# Patient Record
Sex: FEMALE | Race: BLACK OR AFRICAN AMERICAN
[De-identification: names, ages, dates, MRNs, and addresses within clinical notes are randomized per-mention and may not be internally consistent; named-entity substitution may affect disease eponyms.]

---

## 2018-04-13 NOTE — HP
CIWA Score





- CIWA Score


Nausea/Vomitin-No Nausea/No Vomiting


Muscle Tremors: 2


Anxiety: 2


Agitation: 2


Paroxysmal Sweats: 2


Orientation: 0-Oriented


Tacttile Disturbances: 2-Mild Itch/Numbness/Burn (b/t feet, hands and knees)


Auditory Disturbances: 0-None


Visual Disturbances: 1-Very Mild Sensitivity


Headache: 1-Very Mild


CIWA-Ar Total Score: 12





[facility]





- HPI


Chief Complaint: 





"I am feeling sick"


Allergies/Adverse Reactions: 


 Allergies











Allergy/AdvReac Type Severity Reaction Status Date / Time


 


aspirin Allergy Intermediate  Verified 18 19:58


 


Lactose-Intolerance AdvReac Severe  Uncoded 18 19:58











History of Present Illness: 





52 yo female with hx of alcohol, nicotine, marijuana and cocaine dependence is 

here seeking detox. PMHX: HTN , hyperlipidemia, GERD, OA multiple location, 

depression, PTSD, anxiety , bipolar. Denies suicidal / homicidal ideation or 

suicide attempts. Last detox 2 years ago at [facility]. Longest period of 

sobriety 5.5 years.  


Exam Limitations: No Limitations





- Ebola screening


Have you traveled outside of the country in the last 21 days: No (N)


Have you had contact with anyone from an Ebola affected area: No


Have you been sick,other than usual withdrawal symptoms: No


Do you have a fever: No





- Review of Systems


Constitutional: Chills, Changes in sleep, Unintentional Wgt. Loss (5 lbs a 4 

day period)


EENT: reports: Blurred Vision (wears glasses), Dental Problems (dental pain, 

bottom tooth chipped)


Respiratory: reports: No Symptoms reported


Cardiac: reports: No Symptoms Reported


GI: reports: Poor Fluid Intake


: reports: No Symptoms Reported


Musculoskeletal: reports: Back Pain, Joint Pain


Integumentary: reports: No Symptoms Reported


Neuro: reports: Numbness, Tingling, Dizziness


Endocrine: reports: Increased Thirst


Hematology: reports: No Symptoms Reported


Psychiatric: reports: Orientated x3, Anxious





Patient History





- Patient Medical History


Hx Anemia: No


Hx Asthma: No


Hx Chronic Obstructive Pulmonary Disease (COPD): No


Hx Cancer: No


Hx Cardiac Disorders: No


Hx Congestive Heart Failure: No (hjeart murmur )


Hx Hypertension: Yes (on medication)


Hx Hypercholesterolemia: Yes (on med)


Hx Pacemaker: No


HX Cerebrovascular Accident: No


Hx Seizures: No


Hx Dementia: No


Hx Diabetes: No


Hx Gastrointestinal Disorders: No


Hx Liver Disease: No


Hx Genitourinary Disorders: Yes (HPV in 3/2015)


Hx Sexually Transmitted Disorders: Yes (HVP)


Hx Renal Disease (ESRD): No


Hx Thyroid Disease: No


Hx Human Immunodeficiency Virus (HIV): No (last 05/15 )


Hx Hepatitis C: No


Hx Depression: Yes


Hx Suicide Attempt: No


Hx Bipolar Disorder: Yes (no med)


Hx Schizophrenia: No





- Patient Surgical History


Past Surgical History: No


Hx Neurologic Surgery: No


Hx Cataract Extraction: No


Hx Cardiac Surgery: No


Hx Lung Surgery: No


Hx Breast Surgery: No


Hx Breast Biopsy: No


Hx Abdominal Surgery: No


Hx Appendectomy: No


Hx Cholecystectomy: No


Hx Genitourinary Surgery: No


Hx  Section: No


Hx Orthopedic Surgery: No


Anesthesia Reaction: No





- PPD History


Previous Implant?: Yes


Documented Results: Positive w/o proof


PPD to be Administered?: No





- Reproductive History


Last Menstrual Period: 03/23/10


Patient Pregnant: No





- Smoking Cessation


Smoking history: Current every day smoker


Have you smoked in the past 12 months: Yes


Aproximately how many cigarettes per day: 20


Hx Chewing Tobacco Use: No


Initiated information on smoking cessation: Yes


'Breaking Loose' booklet given: 18





- Substance & Tx. History


Hx Alcohol Use: Yes


Hx Substance Use: Yes


Substance Use Type: Alcohol, Cocaine, Marijuana


Hx Substance Use Treatment: Yes (Last detox 2 years ago at [facility])





- Substances Abused


  ** Alcohol


Route: Oral


Frequency: Daily


Amount used: liquor- 1 pint


Age of first use: 13


Date of Last Use: 18





  ** Crack


Route: Smoking


Frequency: Daily


Amount used: 10 bags


Age of first use: 19


Date of Last Use: 18





Family Disease History





- Family Disease History


Family Disease History: Other: Father (alcohol,), Mother ()





Admission Physical Exam [facility]





- Vital Signs


Vital Signs: 


 Vital Signs - 24 hr











  18





  16:01


 


Pulse Rate 74


 


Respiratory 20





Rate 


 


Blood Pressure 140/89














- Physical


General Appearance: Yes: Mild Distress, Sweating, Anxious


HEENTM: Yes: EOMI, Hearing grossly Normal, Normal ENT Inspection, Normocephalic

, Pharynx Normal, Tm's normal, Other (poor dentation)


Respiratory: Yes: Chest Non-Tender, Lungs Clear, Normal Breath Sounds, No 

Respiratory Distress, No Accessory Muscle Use


Neck: Yes: No masses,lesions,Nodules, Trachea in good position


Breast: Yes: Breast Exam Deferred


Cardiology: Yes: Regular Rhythm, Regular Rate, Murmur


Abdominal: Yes: Normal Bowel Sounds, Non Tender, Flat, Soft


Genitourinary: Yes: Within Normal Limits


Back: Yes: Normal Inspection


Musculoskeletal: Yes: full range of Motion, Gait Steady


Extremities: Yes: Normal Capillary Refill, Normal Inspection, Normal Range of 

Motion


Neurological: Yes: CNs II-XII NML intact, Fully Oriented, Alert, Motor Strength 

5/5


Integumentary: Yes: Normal Color, Dry, Warm


Lymphatic: Yes: Within Normal Limits





- Diagnostic


(1) Alcohol dependence with withdrawal


Current Visit: Yes   Status: Acute   


Qualifiers: 


   Complication of substance-induced condition: uncomplicated   Qualified Code(s

): F10.230 - Alcohol dependence with withdrawal, uncomplicated   





(2) Dehydration


Current Visit: Yes   Status: Acute   





(3) Essential hypertension


Current Visit: No   Status: Acute   Comment: .   





(4) Hypercholesterolemia


Current Visit: Yes   Status: Chronic   Comment: .   





(5) Nicotine dependence


Current Visit: Yes   Status: Acute   


Qualifiers: 


   Nicotine product type: cigarettes 


Comment: .   





(6) Weight loss


Current Visit: Yes   Status: Acute   





(7) Cannabis dependence


Current Visit: Yes   Status: Chronic   Comment: .   





(8) Cocaine dependence


Current Visit: Yes   Status: Chronic   


Qualifiers: 


   Substance use status: uncomplicated   Qualified Code(s): F14.20 - Cocaine 

dependence, uncomplicated   


Comment: .   





(9) Eczema


Current Visit: Yes   Status: Chronic   


Qualifiers: 


   Eczema type: unspecified   Qualified Code(s): L30.9 - Dermatitis, 

unspecified   


Comment: .   





Cleared for Admission [facility]





- Detox or Rehab


[facility] Level of Care: Medically Managed


Detox Regimen/Protocol: Librium





[facility] Breath Alcohol Content


Breath Alcohol Content: 0





Urine Pregancy Test





- Result


Urine Pregnancy Test Results: Negative- NO Line Present





Urine Drug Screen





- Results


Drug Screen Negative: No


Urine Drug Screen Results: THC-Marijuana, ALCIRA-Cocaine
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
## 2018-04-14 NOTE — PN
S CIWA





- CIWA Score


Nausea/Vomiting: 3


Muscle Tremors: 3


Anxiety: 3


Agitation: 3


Paroxysmal Sweats: 1-Minimal Palms Moist


Orientation: 0-Oriented


Tacttile Disturbances: 1-Very Mild Itch/Numbness


Auditory Disturbances: 1-Very Mild


Visual Disturbances: 0-None


Headache: 2-Mild


CIWA-Ar Total Score: 17





[facility] Progress Note (SOAP)


Subjective: 





ALERT,IRRITABLE,ANXIOUS,INTERRUPTED SLEEP,TREMOR


Objective: 





04/14/18 13:30


 Vital Signs











Temperature  98.2 F   04/14/18 10:43


 


Pulse Rate  77   04/14/18 10:43


 


Respiratory Rate  18   04/14/18 10:43


 


Blood Pressure  144/77   04/14/18 10:43


 


O2 Sat by Pulse Oximetry (%)      








EKG NSR,NORMAL ECG


 Laboratory Last Values











WBC  5.4 K/mm3 (4.0-10.0)   04/14/18  07:50    


 


RBC  4.13 M/mm3 (3.60-5.2)   04/14/18  07:50    


 


Hgb  12.7 GM/dL (10.7-15.3)   04/14/18  07:50    


 


Hct  38.0 % (32.4-45.2)   04/14/18  07:50    


 


MCV  92.0 fl (80-96)   04/14/18  07:50    


 


MCH  30.8 pg (25.7-33.7)   04/14/18  07:50    


 


MCHC  33.4 g/dl (32.0-36.0)   04/14/18  07:50    


 


RDW  13.7 % (11.6-15.6)   04/14/18  07:50    


 


Plt Count  245 K/MM3 (134-434)   04/14/18  07:50    


 


MPV  9.9 fl (7.5-11.1)   04/14/18  07:50    


 


Sodium  144 mmol/L (136-145)   04/14/18  07:50    


 


Potassium  3.7 mmol/L (3.5-5.1)   04/14/18  07:50    


 


Chloride  107 mmol/L ()   04/14/18  07:50    


 


Carbon Dioxide  31 mmol/L (21-32)   04/14/18  07:50    


 


Anion Gap  6  (8-16)  L  04/14/18  07:50    


 


BUN  9 mg/dL (7-18)   04/14/18  07:50    


 


Creatinine  0.8 mg/dL (0.55-1.02)   04/14/18  07:50    


 


Creat Clearance w eGFR  > 60  (>60)   04/14/18  07:50    


 


Random Glucose  94 mg/dL ()   04/14/18  07:50    


 


Calcium  9.0 mg/dL (8.5-10.1)   04/14/18  07:50    


 


Total Bilirubin  0.4 mg/dL (0.2-1.0)  D 04/14/18  07:50    


 


AST  15 U/L (15-37)   04/14/18  07:50    


 


ALT  17 U/L (12-78)   04/14/18  07:50    


 


Alkaline Phosphatase  48 U/L ()   04/14/18  07:50    


 


Total Protein  6.2 g/dl (6.4-8.2)  L  04/14/18  07:50    


 


Albumin  3.3 g/dl (3.4-5.0)  L  04/14/18  07:50    


 


Urine Color  Dkyellow   04/13/18  21:47    


 


Urine Appearance  Slcloudy   04/13/18  21:47    


 


Urine pH  6.0  (5.0-8.0)   04/13/18  21:47    


 


Ur Specific Gravity  1.027  (1.001-1.035)   04/13/18  21:47    


 


Urine Protein  1+  (NEGATIVE)  H  04/13/18  21:47    


 


Urine Glucose (UA)  1+  (NEGATIVE)  H  04/13/18  21:47    


 


Urine Ketones  Negative  (NEGATIVE)   04/13/18  21:47    


 


Urine Blood  Negative  (NEGATIVE)   04/13/18  21:47    


 


Urine Nitrite  Negative  (NEGATIVE)   04/13/18  21:47    


 


Urine Bilirubin  Negative  (<2.0 mg/dL)   04/13/18  21:47    


 


Urine Urobilinogen  4.0 e.u/dl mg/dL (0.2-1.0)  H  04/13/18  21:47    


 


Ur Leukocyte Esterase  Trace  (NEGATIVE)   04/13/18  21:47    


 


Urine WBC (Auto)  9 /hpf (3-5)   04/13/18  21:47    


 


Urine RBC (Auto)  None /hpf (0-3)   04/13/18  21:47    


 


Ur Epithelial Cells  Rare /HPF (FEW)   04/13/18  21:47    


 


Calcium Oxalate Crystal  Many /hpf (NONE SEEN)   04/13/18  21:47    


 


Urine Mucus  Few   04/13/18  21:47    


 


RPR Titer  Nonreactive  (NONREACTIVE)   04/14/18  07:50    











Assessment: 





04/14/18 13:32


WITHDRAWAL SYMPTOM


Plan: 





CONTINUE DETOX

## 2018-04-15 NOTE — PN
S CIWA





- CIWA Score


Nausea/Vomitin-Mild Nausea/No Vomiting


Muscle Tremors: 4-Moderate,w/Arms Extend


Anxiety: 3


Agitation: 3


Paroxysmal Sweats: 1-Minimal Palms Moist


Orientation: 0-Oriented


Tacttile Disturbances: 1-Very Mild Itch/Numbness


Auditory Disturbances: 0-None


Visual Disturbances: 0-None


Headache: 1-Very Mild


CIWA-Ar Total Score: 14





[facility] Progress Note (SOAP)


Subjective: 





sweat tremor anxiety restlessness mild gi distress


Objective: 





04/15/18 13:16


 Vital Signs











Temperature  97.9 F   04/15/18 12:03


 


Pulse Rate  71   04/15/18 12:03


 


Respiratory Rate  18   04/15/18 12:03


 


Blood Pressure  133/76   04/15/18 12:03


 


O2 Sat by Pulse Oximetry (%)      








 Laboratory Last Values











WBC  5.4 K/mm3 (4.0-10.0)   18  07:50    


 


RBC  4.13 M/mm3 (3.60-5.2)   18  07:50    


 


Hgb  12.7 GM/dL (10.7-15.3)   18  07:50    


 


Hct  38.0 % (32.4-45.2)   18  07:50    


 


MCV  92.0 fl (80-96)   18  07:50    


 


MCH  30.8 pg (25.7-33.7)   18  07:50    


 


MCHC  33.4 g/dl (32.0-36.0)   18  07:50    


 


RDW  13.7 % (11.6-15.6)   18  07:50    


 


Plt Count  245 K/MM3 (134-434)   18  07:50    


 


MPV  9.9 fl (7.5-11.1)   18  07:50    


 


Sodium  144 mmol/L (136-145)   18  07:50    


 


Potassium  3.7 mmol/L (3.5-5.1)   18  07:50    


 


Chloride  107 mmol/L ()   18  07:50    


 


Carbon Dioxide  31 mmol/L (21-32)   18  07:50    


 


Anion Gap  6  (8-16)  L  18  07:50    


 


BUN  9 mg/dL (7-18)   18  07:50    


 


Creatinine  0.8 mg/dL (0.55-1.02)   18  07:50    


 


Creat Clearance w eGFR  > 60  (>60)   18  07:50    


 


Random Glucose  94 mg/dL ()   18  07:50    


 


Calcium  9.0 mg/dL (8.5-10.1)   18  07:50    


 


Total Bilirubin  0.4 mg/dL (0.2-1.0)  D 18  07:50    


 


AST  15 U/L (15-37)   18  07:50    


 


ALT  17 U/L (12-78)   18  07:50    


 


Alkaline Phosphatase  48 U/L ()   18  07:50    


 


Total Protein  6.2 g/dl (6.4-8.2)  L  18  07:50    


 


Albumin  3.3 g/dl (3.4-5.0)  L  18  07:50    


 


Urine Color  Dkyellow   18  21:47    


 


Urine Appearance  Slcloudy   18  21:47    


 


Urine pH  6.0  (5.0-8.0)   18  21:47    


 


Ur Specific Gravity  1.027  (1.001-1.035)   18  21:47    


 


Urine Protein  1+  (NEGATIVE)  H  18  21:47    


 


Urine Glucose (UA)  1+  (NEGATIVE)  H  18  21:47    


 


Urine Ketones  Negative  (NEGATIVE)   18  21:47    


 


Urine Blood  Negative  (NEGATIVE)   18  21:47    


 


Urine Nitrite  Negative  (NEGATIVE)   18  21:47    


 


Urine Bilirubin  Negative  (<2.0 mg/dL)   18  21:47    


 


Urine Urobilinogen  4.0 e.u/dl mg/dL (0.2-1.0)  H  18  21:47    


 


Ur Leukocyte Esterase  Trace  (NEGATIVE)   18  21:47    


 


Urine WBC (Auto)  9 /hpf (3-5)   18  21:47    


 


Urine RBC (Auto)  None /hpf (0-3)   18  21:47    


 


Ur Epithelial Cells  Rare /HPF (FEW)   18  21:47    


 


Calcium Oxalate Crystal  Many /hpf (NONE SEEN)   18  21:47    


 


Urine Mucus  Few   18  21:47    


 


RPR Titer  Nonreactive  (NONREACTIVE)   18  07:50    








lab noted


increase oral fluid


Assessment: 





04/15/18 13:17


withdrawal sx


Plan: 





continue detox

## 2018-04-16 NOTE — PN
[facility] Progress Note (SOAP)


Subjective: 





ALERT,IRRITABLE,ANXIOUS,INTERRUPTED SLEEP


Objective: 





04/16/18 10:03


ALERT,IRRITABLE,ANXIOUS,INTERRUPTED SLEEP


04/16/18 10:05


 Vital Signs











Temperature  96.0 F L  04/16/18 06:22


 


Pulse Rate  64   04/16/18 06:22


 


Respiratory Rate  18   04/16/18 06:22


 


Blood Pressure  157/92   04/16/18 06:22


 


O2 Sat by Pulse Oximetry (%)      











Assessment: 





04/16/18 10:06


WITHDRAWAL SYMPTOM


Plan: 





CONTINUE DETOX,DISCHARGE IN AM

## 2018-04-16 NOTE — EKG
Test Reason : 

Blood Pressure : ***/*** mmHG

Vent. Rate : 074 BPM     Atrial Rate : 074 BPM

   P-R Int : 146 ms          QRS Dur : 084 ms

    QT Int : 384 ms       P-R-T Axes : 070 054 036 degrees

   QTc Int : 426 ms

 

NORMAL SINUS RHYTHM

NORMAL ECG

NO PREVIOUS ECGS AVAILABLE

Confirmed by CHERRI AGUILAR MD ([id number]) on 4/16/2018 12:42:33 PM

 

Referred By:             Confirmed By:CHERRI AGUILAR MD

## 2018-04-17 NOTE — DS
[facility] Detox Discharge Summary


Admission Date: 


04/13/18





Discharge Date: 04/17/18





- History


Present History: Alcohol Dependence, Cannabis Dependence, Cocaine Dependence


Additional Comments: 





FOLLOW UP WITH [facility] AS ARRANGEMENT


Pertinent Past History: 





ESSENTIAL HYPERTENSION


HYPERCHOLESTEROLEMIA


NICOTINE DEPENDENCE


WEIGHT LOSS


ECZEMA





- Physical Exam Results


Vital Signs: 


 Vital Signs











Temperature  97.3 F L  04/17/18 06:22


 


Pulse Rate  64   04/17/18 06:22


 


Respiratory Rate  18   04/17/18 06:22


 


Blood Pressure  139/75   04/17/18 06:22


 


O2 Sat by Pulse Oximetry (%)      











Pertinent Admission Physical Exam Findings: 





WITHDRAWAL SIGNS AND SYMPTOM


 Vital Signs











Temperature  97.3 F L  04/17/18 06:22


 


Pulse Rate  64   04/17/18 06:22


 


Respiratory Rate  18   04/17/18 06:22


 


Blood Pressure  139/75   04/17/18 06:22


 


O2 Sat by Pulse Oximetry (%)      








 Laboratory Last Values











WBC  5.4 K/mm3 (4.0-10.0)   04/14/18  07:50    


 


RBC  4.13 M/mm3 (3.60-5.2)   04/14/18  07:50    


 


Hgb  12.7 GM/dL (10.7-15.3)   04/14/18  07:50    


 


Hct  38.0 % (32.4-45.2)   04/14/18  07:50    


 


MCV  92.0 fl (80-96)   04/14/18  07:50    


 


MCH  30.8 pg (25.7-33.7)   04/14/18  07:50    


 


MCHC  33.4 g/dl (32.0-36.0)   04/14/18  07:50    


 


RDW  13.7 % (11.6-15.6)   04/14/18  07:50    


 


Plt Count  245 K/MM3 (134-434)   04/14/18  07:50    


 


MPV  9.9 fl (7.5-11.1)   04/14/18  07:50    


 


Sodium  144 mmol/L (136-145)   04/14/18  07:50    


 


Potassium  3.7 mmol/L (3.5-5.1)   04/14/18  07:50    


 


Chloride  107 mmol/L ()   04/14/18  07:50    


 


Carbon Dioxide  31 mmol/L (21-32)   04/14/18  07:50    


 


Anion Gap  6  (8-16)  L  04/14/18  07:50    


 


BUN  9 mg/dL (7-18)   04/14/18  07:50    


 


Creatinine  0.8 mg/dL (0.55-1.02)   04/14/18  07:50    


 


Creat Clearance w eGFR  > 60  (>60)   04/14/18  07:50    


 


Random Glucose  94 mg/dL ()   04/14/18  07:50    


 


Calcium  9.0 mg/dL (8.5-10.1)   04/14/18  07:50    


 


Total Bilirubin  0.4 mg/dL (0.2-1.0)  D 04/14/18  07:50    


 


AST  15 U/L (15-37)   04/14/18  07:50    


 


ALT  17 U/L (12-78)   04/14/18  07:50    


 


Alkaline Phosphatase  48 U/L ()   04/14/18  07:50    


 


Total Protein  6.2 g/dl (6.4-8.2)  L  04/14/18  07:50    


 


Albumin  3.3 g/dl (3.4-5.0)  L  04/14/18  07:50    


 


Urine Color  Dkyellow   04/13/18  21:47    


 


Urine Appearance  Slcloudy   04/13/18  21:47    


 


Urine pH  6.0  (5.0-8.0)   04/13/18  21:47    


 


Ur Specific Gravity  1.027  (1.001-1.035)   04/13/18  21:47    


 


Urine Protein  1+  (NEGATIVE)  H  04/13/18  21:47    


 


Urine Glucose (UA)  1+  (NEGATIVE)  H  04/13/18  21:47    


 


Urine Ketones  Negative  (NEGATIVE)   04/13/18  21:47    


 


Urine Blood  Negative  (NEGATIVE)   04/13/18  21:47    


 


Urine Nitrite  Negative  (NEGATIVE)   04/13/18  21:47    


 


Urine Bilirubin  Negative  (<2.0 mg/dL)   04/13/18  21:47    


 


Urine Urobilinogen  4.0 e.u/dl mg/dL (0.2-1.0)  H  04/13/18  21:47    


 


Ur Leukocyte Esterase  Trace  (NEGATIVE)   04/13/18  21:47    


 


Urine WBC (Auto)  9 /hpf (3-5)   04/13/18  21:47    


 


Urine RBC (Auto)  None /hpf (0-3)   04/13/18  21:47    


 


Ur Epithelial Cells  Rare /HPF (FEW)   04/13/18  21:47    


 


Calcium Oxalate Crystal  Many /hpf (NONE SEEN)   04/13/18  21:47    


 


Urine Mucus  Few   04/13/18  21:47    


 


RPR Titer  Nonreactive  (NONREACTIVE)   04/14/18  07:50    














- Treatment


Hospital Course: Detox Protocol Followed, Detoxed Safely, Responded well, 

Discharged Condition Good, Rehab Referral Accepted


Patient has Accepted a Rehab Referral to: [facility]





- Medication


Discharge Medications: 


Ambulatory Orders





Calcium + Vitamin D Tablet 1 PO BID 09/12/15 


Cyproheptadine HCl 1 mg PO TID 09/12/15 


Pravastatin Sodium [Pravachol -] 10 mg PO HS 09/12/15 


Naproxen [Naprosyn -] 500 mg PO BID #20 tablet 10/02/15 


Thiamine HCl [Vitamin B1 -] 100 mg PO HS #30 tablet 10/02/15 


Risperidone [Risperdal] 1 mg PO DAILY 04/13/18 


Atorvastatin Ca [Lipitor] 10 mg PO HS #30 tablet 04/16/18 


Hydrochlorothiazide [Hctz -] 25 mg PO DAILY@0600 #30 tablet 04/16/18 


Metoprolol Tartrate [Lopressor -] 50 mg PO BID@0600,1800 #60 tablet 04/16/18 


Pantoprazole Sodium [Protonix] 20 mg PO DAILY #30 tablet. 04/16/18 











- Diagnosis


(1) Alcohol dependence with withdrawal


Current Visit: Yes   Status: Acute   


Qualifiers: 


   Complication of substance-induced condition: uncomplicated   Qualified Code(s

): F10.230 - Alcohol dependence with withdrawal, uncomplicated   





(2) Dehydration


Current Visit: Yes   Status: Acute   





(3) Nicotine dependence


Current Visit: Yes   Status: Acute   


Qualifiers: 


   Nicotine product type: cigarettes 





(4) Weight loss


Current Visit: Yes   Status: Acute   





(5) Cannabis dependence


Current Visit: Yes   Status: Chronic   





(6) Cocaine dependence


Current Visit: Yes   Status: Chronic   


Qualifiers: 


   Substance use status: uncomplicated   Qualified Code(s): F14.20 - Cocaine 

dependence, uncomplicated   





(7) Eczema


Current Visit: Yes   Status: Chronic   


Qualifiers: 


   Eczema type: unspecified   Qualified Code(s): L30.9 - Dermatitis, 

unspecified   





(8) Hypercholesterolemia


Current Visit: Yes   Status: Chronic   





(9) Essential hypertension


Current Visit: No   Status: Acute   





(10) Syncope


Current Visit: No   Status: Acute   





(11) G6PD deficiency


Current Visit: No   Status: Chronic   





(12) Substance induced mood disorder


Current Visit: No   Status: Chronic   





(13) Bipolar disorder


Current Visit: Yes   Status: Chronic   





- AMA


Did Patient Leave Against Medical Advice: No

## 2018-04-17 NOTE — PN
[facility] Progress Note (SOAP)


Subjective: 





ALERT,NO COMPLAINT


Objective: 





04/17/18 09:01


 Vital Signs











Temperature  97.3 F L  04/17/18 06:22


 


Pulse Rate  64   04/17/18 06:22


 


Respiratory Rate  18   04/17/18 06:22


 


Blood Pressure  139/75   04/17/18 06:22


 


O2 Sat by Pulse Oximetry (%)      








DETOX COMPLETED,NO WITHDRAWAL SYMPTOM


Assessment: 





04/17/18 09:02


DETOX COMPLETED,NO WITHDRAWAL SYMPTOM


Plan: 





DISCHARGE TODAY,FOLLOW UP WITH AFTER CARE PROGRAM AS ARRANGEMENT

## 2018-07-18 NOTE — HP
CIWA Score





- CIWA Score


Nausea/Vomiting: 3


Muscle Tremors: 3


Anxiety: 3


Agitation: 2


Paroxysmal Sweats: 1-Minimal Palms Moist


Orientation: 0-Oriented


Tacttile Disturbances: 1-Very Mild Itch/Numbness


Auditory Disturbances: 1-Very Mild


Visual Disturbances: 0-None


Headache: 2-Mild


CIWA-Ar Total Score: 16





Admission ROS [facility]





- HPI


Chief Complaint: 





i need help to stop drinking alcohol,crack and marijuana


Allergies/Adverse Reactions: 


 Allergies











Allergy/AdvReac Type Severity Reaction Status Date / Time


 


aspirin Allergy Intermediate  Verified 18 14:40


 


lactose AdvReac Severe LACTOSE Verified 18 16:06





   INTOLERANCE  


 


Lactose-Intolerance AdvReac Severe  Uncoded 18 14:40











History of Present Illness: 





this 53 years old female with alcohol,cocaine and marijuana dependence,seeking 

detox,withdrawal symptom,last detox 18 to 18


syncope alcohol related


htn


nicotine dependence


bipolar disorder


longest period of sobriety 6 years


Exam Limitations: No Limitations





- Ebola screening


Have you traveled outside of the country in the last 21 days: No (N)


Have you had contact with anyone from an Ebola affected area: No


Have you been sick,other than usual withdrawal symptoms: No


Do you have a fever: No





- Review of Systems


Constitutional: Loss of Appetite, Night Sweats, Changes in sleep, Weakness, 

Unintentional Wgt. Loss


EENT: reports: Tearing, Nose Congestion


Respiratory: reports: No Symptoms reported


Cardiac: reports: No Symptoms Reported


GI: reports: Diarrhea, Nausea, Vomiting, Abdominal cramping


: reports: No Symptoms Reported


Musculoskeletal: reports: Back Pain, Muscle Pain


Integumentary: reports: Dryness


Neuro: reports: Headache, Tremors


Endocrine: reports: No Symptoms Reported


Hematology: reports: No Symptoms Reported


Psychiatric: reports: No Sypmtoms Reported, Judgement Intact, Mood/Affect 

Appropiate, other (bipolar disorder)





Patient History





- Patient Medical History


Hx Anemia: No


Hx Asthma: No


Hx Chronic Obstructive Pulmonary Disease (COPD): No


Hx Cancer: No


Hx Cardiac Disorders: No


Hx Congestive Heart Failure: No (hjeart murmur )


Hx Hypertension: Yes (on med)


Hx Hypercholesterolemia: Yes (on med)


Hx Pacemaker: No


HX Cerebrovascular Accident: No


Hx Seizures: No


Hx Dementia: No


Hx Diabetes: No


Hx Gastrointestinal Disorders: Yes (acid reflux)


Hx Liver Disease: No


Hx Genitourinary Disorders: No


Hx Sexually Transmitted Disorders: No


Hx Renal Disease (ESRD): No


Hx Thyroid Disease: No


Hx Human Immunodeficiency Virus (HIV): No (last  negative)


Hx Hepatitis C: No


Hx Depression: Yes


Hx Suicide Attempt: No


Hx Bipolar Disorder: Yes (non compliance)


Hx Schizophrenia: No


Other Medical History: no suicidal,no homicidal





- Patient Surgical History


Past Surgical History: No


Hx Neurologic Surgery: No


Hx Cataract Extraction: No


Hx Cardiac Surgery: No


Hx Lung Surgery: No


Hx Breast Surgery: No


Hx Breast Biopsy: No


Hx Abdominal Surgery: No


Hx Appendectomy: No


Hx Cholecystectomy: No


Hx Genitourinary Surgery: No


Hx  Section: No


Hx Orthopedic Surgery: No


Anesthesia Reaction: No





- PPD History


Previous Implant?: Yes


Documented Results: Positive w/o proof


PPD to be Administered?: No





- Reproductive History


Patient is a Female of Child Bearing Age (11 -55 yrs old): Yes


Last Menstrual Period: 03/23/10


Patient Pregnant: No





- Smoking Cessation


Smoking history: Current every day smoker


Have you smoked in the past 12 months: Yes


Aproximately how many cigarettes per day: 30


Hx Chewing Tobacco Use: No


Initiated information on smoking cessation: Yes


'Breaking Loose' booklet given: 18





- Substance & Tx. History


Hx Alcohol Use: Yes


Hx Substance Use: Yes


Substance Use Type: Alcohol, Cocaine, Marijuana





- Substances Abused


  ** Crack


Route: Smoking


Frequency: Daily


Amount used: $200


Age of first use: 19


Date of Last Use: 18





  ** Alcohol-vodka


Route: Oral


Frequency: Daily


Amount used: 2 pts.


Age of first use: 16


Date of Last Use: 18





  ** Marijuana


Route: Smoking


Frequency: 3-6 times per week


Amount used: $20


Age of first use: 13


Date of Last Use: 18





Family Disease History





- Family Disease History


Family Disease History: Other: Father (alcohol,), Mother ()





Admission Physical Exam [facility]





- Vital Signs


Vital Signs: 


 Vital Signs - 24 hr











  18





  15:16


 


Temperature 97.6 F


 


Pulse Rate 77


 


Respiratory 16





Rate 


 


Blood Pressure 146/90














- Physical


General Appearance: Yes: Moderate Distress, Tremorous, Irritable, Sweating, 

Anxious


HEENTM: Yes: BROOK, Pharynx Normal


Respiratory: Yes: Lungs Clear, Normal Breath Sounds, No Respiratory Distress


Neck: Yes: Within Normal Limits, Supple, Trachea in good position


Breast: Yes: Breast Exam Deferred


Cardiology: Yes: Within Normal Limits, Regular Rhythm, Regular Rate, S1, S2


Abdominal: Yes: Within Normal Limits, Normal Bowel Sounds, Non Tender, Soft


Genitourinary: Yes: Within Normal Limits


Back: Yes: Muscle Spasm


Musculoskeletal: Yes: Back pain, Muscle Pain


Extremities: Yes: Tremors


Neurological: Yes: CNs II-XII NML intact, Fully Oriented, Alert, Motor Strength 

5/5


Integumentary: Yes: Dry


Lymphatic: Yes: Within Normal Limits





- Diagnostic


(1) Alcohol dependence with withdrawal


Current Visit: No   Status: Acute   


Qualifiers: 


   Complication of substance-induced condition: uncomplicated   Qualified Code(s

): F10.230 - Alcohol dependence with withdrawal, uncomplicated   





(2) Dehydration


Current Visit: No   Status: Acute   





(3) Essential hypertension


Current Visit: No   Status: Acute   Comment: .   





(4) Nicotine dependence


Current Visit: No   Status: Acute   


Qualifiers: 


   Nicotine product type: cigarettes 


Comment: .   





(5) Syncope


Current Visit: No   Status: Acute   





(6) Weight loss


Current Visit: No   Status: Acute   





(7) Cannabis dependence


Current Visit: No   Status: Chronic   Comment: .   





(8) Cocaine dependence


Current Visit: No   Status: Chronic   


Qualifiers: 


   Substance use status: uncomplicated   Qualified Code(s): F14.20 - Cocaine 

dependence, uncomplicated   


Comment: .   





(9) G6PD deficiency


Current Visit: No   Status: Chronic   Comment: .   





(10) Hypercholesterolemia


Current Visit: No   Status: Chronic   Comment: .   





Cleared for Admission [facility]





- Detox or Rehab


[facility] Level of Care: Medically Managed


Detox Regimen/Protocol: Librium





[facility] Breath Alcohol Content


Breath Alcohol Content: 0





Urine Drug Screen





- Results


Drug Screen Negative: Yes


Urine Drug Screen Results: THC-Marijuana, ALCIRA-Cocaine

## 2018-07-19 NOTE — CONSULT
[facility] Psychiatric Consult





- Data


Date of interview: 07/19/18


Admission source: [facility]


Identifying data: This is 53 years old female, single, unemployed, living with 

family, with no income, history of Bipolar disorder,  with alcohol,cocaine and 

marijuana dependence,seeking detox reporting withdrawal symptoms,.  Patient 

denies psychiatric hospitalization history


Substance Abuse History: Smoking history: Current every day smoker.  Have you 

smoked in the past 12 months: Yes.  Aproximately how many cigarettes per day: 

30.  Hx Chewing Tobacco Use: No.  Initiated information on smoking cessation: 

Yes.  'Breaking Loose' booklet given: 07/18/18.  - Substance & Tx. History.  Hx 

Alcohol Use: Yes.  Hx Substance Use: Yes.  Substance Use Type: Alcohol, Cocaine

, Marijuana.  - Substances Abused.  ** Crack.  Route: Smoking.  Frequency: 

Daily.  Amount used: $200.  Age of first use: 19.  Date of Last Use: 07/17/18.  

** Alcohol-vodka.  Route: Oral.  Frequency: Daily.  Amount used: 2 pts.  Age of 

first use: 16.  Date of Last Use: 07/17/18.  ** Marijuana.  Route: Smoking.  

Frequency: 3-6 times per week.  Amount used: $20.  Age of first use: 13.  Date 

of Last Use: 07/17/18


Medical History: HTN, Weight loss, Hypercholesterolemia, Syncope history, NON 

Compliance with medications,


Psychiatric History: As per jaswinder bpatient suffers with Bipolar disorder, 

patient denies spsychiatric hospitalization history, reports history of 

depression and anxiety, reports taking Risperdal 1mg poqd prior to admission, 

denies suicidal and homicidal history.


Physical/Sexual Abuse/Trauma History: Denies


Additional Comment: Risperdal 1mg poqd





Mental Status Exam





- Mental Status Exam


Alert and Oriented to: Person


Cognitive Function: Fair


Patient Appearance: Unkempt


Mood: Sad


Affect: Flat


Patient Behavior: Sedated


Speech Pattern: Delayed


Voice Loudness: Mildly Soft/Quiet


Thought Process: Circumstantial


Thought Disorder: Being Controlled


Hallucinations: Denies


Suicidal Ideation: Denies


Homicidal Ideation: Denies


Insight/Judgement: Fair


Sleep: Difficulty falling asleep


Appetite: Weight loss


Muscle strength/Tone: Mild Hypotonicity


Gait/Station: Shuffling


Additional Comments: Risperdal 1mg poqd





Psychiatric Findings





- Problem List (Axis 1, 2,3)


(1) Alcohol dependence with withdrawal


Current Visit: No   Status: Acute   


Qualifiers: 


   Complication of substance-induced condition: uncomplicated   Qualified Code(s

): F10.230 - Alcohol dependence with withdrawal, uncomplicated   





(2) Mood disorder


Current Visit: No   Status: Acute   





(3) Syncope


Current Visit: No   Status: Acute   





(4) Weight loss


Current Visit: No   Status: Acute   





(5) Alcohol dependence


Current Visit: No   Status: Chronic   Comment: .   





(6) Bipolar disorder


Current Visit: No   Status: Chronic   Comment: History.    





(7) Cannabis dependence


Current Visit: No   Status: Chronic   Comment: .   





(8) Cocaine dependence


Current Visit: No   Status: Chronic   


Qualifiers: 


   Substance use status: uncomplicated   Qualified Code(s): F14.20 - Cocaine 

dependence, uncomplicated   


Comment: .   





(9) Eczema


Current Visit: No   Status: Chronic   


Qualifiers: 


   Eczema type: unspecified   Qualified Code(s): L30.9 - Dermatitis, 

unspecified   


Comment: .   





(10) G6PD deficiency


Current Visit: No   Status: Chronic   Comment: .   





(11) Hypercholesterolemia


Current Visit: No   Status: Chronic   Comment: .   





(12) Substance induced mood disorder


Current Visit: No   Status: Chronic   Comment: .   





- Initial Treatment Plan


Initial Treatment Plan: Risperdal 1mg poqd

## 2018-07-19 NOTE — EKG
Test Reason : 

Blood Pressure : ***/*** mmHG

Vent. Rate : 070 BPM     Atrial Rate : 070 BPM

   P-R Int : 154 ms          QRS Dur : 078 ms

    QT Int : 406 ms       P-R-T Axes : 051 056 032 degrees

   QTc Int : 438 ms

 

NORMAL SINUS RHYTHM

NORMAL ECG

WHEN COMPARED WITH ECG OF 13-APR-2018 21:49,

NO SIGNIFICANT CHANGE WAS FOUND

Confirmed by BONNIE FOURNIER MD ([id number]) on 7/19/2018 2:26:32 PM

 

Referred By:             Confirmed By:BONNIE FOURNIER MD

## 2018-07-19 NOTE — PN
[facility] CIWA





- CIWA Score


Nausea/Vomitin-Mild Nausea/No Vomiting


Muscle Tremors: 4-Moderate,w/Arms Extend


Anxiety: 4-Mod. Anxious/Guarded


Agitation: 3


Paroxysmal Sweats: 1-Minimal Palms Moist


Orientation: 0-Oriented


Tacttile Disturbances: 1-Very Mild Itch/Numbness


Auditory Disturbances: 0-None


Visual Disturbances: 0-None


Headache: 0-None Present


CIWA-Ar Total Score: 14





[facility] Progress Note (SOAP)


Subjective: 





SWEAT TREMOR RESTLESSNESS IRRITABLE


Objective: 





18 10:57


 Vital Signs











Temperature  97.7 F   18 10:24


 


Pulse Rate  61   18 10:24


 


Respiratory Rate  18   18 10:24


 


Blood Pressure  133/84   18 10:24


 


O2 Sat by Pulse Oximetry (%)      








 Laboratory Last Values











WBC  6.2 K/mm3 (4.0-10.0)   18  06:00    


 


RBC  3.94 M/mm3 (3.60-5.2)   18  06:00    


 


Hgb  12.3 GM/dL (10.7-15.3)   18  06:00    


 


Hct  36.9 % (32.4-45.2)   18  06:00    


 


MCV  93.5 fl (80-96)   18  06:00    


 


MCH  31.1 pg (25.7-33.7)   18  06:00    


 


MCHC  33.2 g/dl (32.0-36.0)   18  06:00    


 


RDW  13.7 % (11.6-15.6)   18  06:00    


 


Plt Count  281 K/MM3 (134-434)   18  06:00    


 


MPV  10.3 fl (7.5-11.1)   18  06:00    


 


Sodium  142 mmol/L (136-145)   18  06:00    


 


Potassium  3.4 mmol/L (3.5-5.1)  L  18  06:00    


 


Chloride  106 mmol/L ()   18  06:00    


 


Carbon Dioxide  29 mmol/L (21-32)   18  06:00    


 


Anion Gap  7  (8-16)  L  18  06:00    


 


BUN  8 mg/dL (7-18)   18  06:00    


 


Creatinine  0.9 mg/dL (0.55-1.02)   18  06:00    


 


Creat Clearance w eGFR  > 60  (>60)   18  06:00    


 


Random Glucose  93 mg/dL ()   18  06:00    


 


Calcium  9.0 mg/dL (8.5-10.1)   18  06:00    


 


Total Bilirubin  0.3 mg/dL (0.2-1.0)   18  06:00    


 


AST  16 U/L (15-37)   18  06:00    


 


ALT  18 U/L (12-78)   18  06:00    


 


Alkaline Phosphatase  58 U/L ()   18  06:00    


 


Total Protein  7.1 g/dl (6.4-8.2)   18  06:00    


 


Albumin  3.6 g/dl (3.4-5.0)   18  06:00    


 


Urine Color  Yellow   18  22:40    


 


Urine Appearance  Turbid   18  22:40    


 


Urine pH  5.0  (5.0-8.0)   18  22:40    


 


Ur Specific Gravity  1.030  (1.001-1.035)   18  22:40    


 


Urine Protein  2+  (NEGATIVE)  H  18  22:40    


 


Urine Glucose (UA)  Negative  (NEGATIVE)   18  22:40    


 


Urine Ketones  Trace  (NEGATIVE)  H  18  22:40    


 


Urine Blood  Negative  (NEGATIVE)   18  22:40    


 


Urine Nitrite  Negative  (NEGATIVE)   18  22:40    


 


Urine Bilirubin  Negative  (<2.0 mg/dL)   18  22:40    


 


Urine Urobilinogen  2.0 mg/dL (0.2-1.0)  H  18  22:40    


 


Ur Leukocyte Esterase  1+  (NEGATIVE)  H  18  22:40    


 


Urine WBC (Auto)  20 /hpf (3-5)   18  22:40    


 


Urine RBC (Auto)  3 /hpf (0-3)   18  22:40    


 


Urine Bacteria  Many /hpf (NONE SEEN)   18  22:40    


 


Urine Mucus  Moderate   18  22:40    


 


Urine Yeast  Few   18  22:40    








LAB NOTED


REPEAT UA


REPEAT k+ ON 18


k+ SUPPLEMENT


18 11:01





Assessment: 





18 11:01


WITHDRAWAL SX


Plan: 





CONTINUE DETOX

## 2018-07-19 NOTE — PN
[facility] Progress Note


Note: 





Patient's blood pressure this morning is B/P 167/113. Patient is asymptomatic.


 Vital Signs











Temperature  98.8 F   07/19/18 06:40


 


Pulse Rate  77   07/19/18 06:40


 


Respiratory Rate  18   07/19/18 06:40


 


Blood Pressure  167/113   07/19/18 06:40


 


O2 Sat by Pulse Oximetry (%)      








Action: Clonidine 0.1mg tablet oral given

## 2018-07-20 NOTE — PN
[facility] CIWA





- CIWA Score


Nausea/Vomitin-No Nausea/No Vomiting


Muscle Tremors: 1-None Visible, but Felt


Anxiety: 1-Mildly Anxious


Agitation: 1-Slight > Activity


Paroxysmal Sweats: 1-Minimal Palms Moist


Orientation: 0-Oriented


Tacttile Disturbances: 0-None


Auditory Disturbances: 0-None


Visual Disturbances: 0-None


Headache: 0-None Present


CIWA-Ar Total Score: 4





B[facility] Progress Note (SOAP)


Subjective: 


pt states is on 2 BP meds but thinks she may need additional meds, doing well 

with detox 





Objective: 





18 15:22


 Laboratory Tests











  18





  22:40 06:00 06:00


 


WBC   6.2 


 


RBC   3.94 


 


Hgb   12.3 


 


Hct   36.9 


 


MCV   93.5 


 


MCH   31.1 


 


MCHC   33.2 


 


RDW   13.7 


 


Plt Count   281 


 


MPV   10.3 


 


Sodium    142


 


Potassium    3.4 L


 


Chloride    106


 


Carbon Dioxide    29


 


Anion Gap    7 L


 


BUN    8


 


Creatinine    0.9


 


Creat Clearance w eGFR    > 60


 


Random Glucose    93


 


Calcium    9.0


 


Total Bilirubin    0.3


 


AST    16


 


ALT    18


 


Alkaline Phosphatase    58


 


Total Protein    7.1


 


Albumin    3.6


 


Urine Color  Yellow  


 


Urine Appearance  Turbid  


 


Urine pH  5.0  


 


Ur Specific Gravity  1.030  


 


Urine Protein  2+ H  


 


Urine Glucose (UA)  Negative  


 


Urine Ketones  Trace H  


 


Urine Blood  Negative  


 


Urine Nitrite  Negative  


 


Urine Bilirubin  Negative  


 


Urine Urobilinogen  2.0 H  


 


Ur Leukocyte Esterase  1+ H  


 


Urine WBC (Auto)  20  


 


Urine RBC (Auto)  3  


 


Ur Epithelial Cells   


 


Urine Bacteria  Many  


 


Hyaline Casts   


 


Urine Mucus  Moderate  


 


Urine Yeast  Few  


 


RPR Titer   














  18





  06:00 08:00


 


WBC  


 


RBC  


 


Hgb  


 


Hct  


 


MCV  


 


MCH  


 


MCHC  


 


RDW  


 


Plt Count  


 


MPV  


 


Sodium  


 


Potassium  


 


Chloride  


 


Carbon Dioxide  


 


Anion Gap  


 


BUN  


 


Creatinine  


 


Creat Clearance w eGFR  


 


Random Glucose  


 


Calcium  


 


Total Bilirubin  


 


AST  


 


ALT  


 


Alkaline Phosphatase  


 


Total Protein  


 


Albumin  


 


Urine Color   Yellow


 


Urine Appearance   Slcloudy


 


Urine pH   6.0


 


Ur Specific Gravity   1.017


 


Urine Protein   Negative


 


Urine Glucose (UA)   Negative


 


Urine Ketones   Negative


 


Urine Blood   Negative


 


Urine Nitrite   Negative


 


Urine Bilirubin   Negative


 


Urine Urobilinogen   Negative


 


Ur Leukocyte Esterase   3+ H D


 


Urine WBC (Auto)   None


 


Urine RBC (Auto)   1


 


Ur Epithelial Cells   Rare


 


Urine Bacteria  


 


Hyaline Casts   105


 


Urine Mucus   Rare


 


Urine Yeast  


 


RPR Titer  Nonreactive 








 Vital Signs - 24 hr











  18/18





  18:53 22:54 00:30


 


Temperature 97.7 F 97.5 F L 


 


Pulse Rate 65 79 


 


Respiratory 18 18 18





Rate   


 


Blood Pressure 121/69 131/83 














  18





  03:30 06:21 14:00


 


Temperature  98.1 F 99.1 F


 


Pulse Rate  67 93 H


 


Respiratory 18 17 16





Rate   


 


Blood Pressure  150/89 133/76








VS WNL


mildly decrased K


pt ambulating without problems


Assessment: 





18 15:23


alcohol use disorder


mildly decreased K


Plan: 





continue alcohol detox protocol


will replete K


BP WNL on 2 meds

## 2018-07-21 NOTE — PN
BHS Progress Note (SOAP)


Subjective: 





alert,irritable,anxious,interrupted sleep


Objective: 





07/21/18 14:33


 Vital Signs











Temperature  97.5 F L  07/21/18 11:42


 


Pulse Rate  66   07/21/18 11:42


 


Respiratory Rate  14   07/21/18 11:42


 


Blood Pressure  122/56   07/21/18 11:42


 


O2 Sat by Pulse Oximetry (%)      











Assessment: 





07/21/18 14:34


withdrawal symptom


Plan: 





continue detox,discharge in am

## 2018-07-22 NOTE — HP
[facility] MD Rehab Assess/Revision





- Admission History


Ad[facility] to Rehab from: [facility]


Date of Admission to Rehab: 07/22/18





- Vital signs


Vital Signs: 


 Vital Signs











 Period  Temp  Pulse  Resp  BP Sys/Clifton  Pulse Ox


 


 Last 24 Hr  97.3 F  76  16  115/70  














- Findings


Detox History & Physical reviewed: Yes


Concur with findings: Yes


Comments/Additional Findings: transferred from detox to rehab admission as per 

protocol





Inpatient Rehab Admission





- Rehab Admission Criteria


Previous failed treatment: Yes


Poor recovery environment: Yes


Comorbidities: Yes


Lacks judgement: No


Patient is meeting Inpatient Rehab admission criteria:: Yes

## 2018-07-22 NOTE — DS
[facility] Detox Discharge Summary


Admission Date: 


07/18/18





Discharge Date: 07/22/18





- History


Present History: Alcohol Dependence


Additional Comments: 





53 years old female admitted on 7/18/18 for alcohol withdrawal sx


completed alcohol detox regimen tolerated well denies alcohol withdrawal sx


alert oriented x 3 no acute distress aftercare [facility]





- Physical Exam Results


Vital Signs: 


 Vital Signs











Temperature  97.9 F   07/22/18 08:10


 


Pulse Rate  60   07/22/18 08:10


 


Respiratory Rate  18   07/22/18 08:10


 


Blood Pressure  142/75   07/22/18 08:10


 


O2 Sat by Pulse Oximetry (%)      











Pertinent Admission Physical Exam Findings: 





alcohol withdrawal sx


 Vital Signs











Temperature  99.0 F   07/22/18 10:29


 


Pulse Rate  88   07/22/18 10:29


 


Respiratory Rate  16   07/22/18 10:29


 


Blood Pressure  141/71   07/22/18 10:29


 


O2 Sat by Pulse Oximetry (%)      








 Laboratory Last Values











WBC  6.2 K/mm3 (4.0-10.0)   07/19/18  06:00    


 


RBC  3.94 M/mm3 (3.60-5.2)   07/19/18  06:00    


 


Hgb  12.3 GM/dL (10.7-15.3)   07/19/18  06:00    


 


Hct  36.9 % (32.4-45.2)   07/19/18  06:00    


 


MCV  93.5 fl (80-96)   07/19/18  06:00    


 


MCH  31.1 pg (25.7-33.7)   07/19/18  06:00    


 


MCHC  33.2 g/dl (32.0-36.0)   07/19/18  06:00    


 


RDW  13.7 % (11.6-15.6)   07/19/18  06:00    


 


Plt Count  281 K/MM3 (134-434)   07/19/18  06:00    


 


MPV  10.3 fl (7.5-11.1)   07/19/18  06:00    


 


Sodium  142 mmol/L (136-145)   07/19/18  06:00    


 


Potassium  4.1 mmol/L (3.5-5.1)   07/21/18  07:30    


 


Chloride  106 mmol/L ()   07/19/18  06:00    


 


Carbon Dioxide  29 mmol/L (21-32)   07/19/18  06:00    


 


Anion Gap  7  (8-16)  L  07/19/18  06:00    


 


BUN  8 mg/dL (7-18)   07/19/18  06:00    


 


Creatinine  0.9 mg/dL (0.55-1.02)   07/19/18  06:00    


 


Creat Clearance w eGFR  > 60  (>60)   07/19/18  06:00    


 


Random Glucose  93 mg/dL ()   07/19/18  06:00    


 


Calcium  9.0 mg/dL (8.5-10.1)   07/19/18  06:00    


 


Total Bilirubin  0.3 mg/dL (0.2-1.0)   07/19/18  06:00    


 


AST  16 U/L (15-37)   07/19/18  06:00    


 


ALT  18 U/L (12-78)   07/19/18  06:00    


 


Alkaline Phosphatase  58 U/L ()   07/19/18  06:00    


 


Total Protein  7.1 g/dl (6.4-8.2)   07/19/18  06:00    


 


Albumin  3.6 g/dl (3.4-5.0)   07/19/18  06:00    


 


Urine Color  Yellow   07/20/18  08:00    


 


Urine Appearance  Slcloudy   07/20/18  08:00    


 


Urine pH  6.0  (5.0-8.0)   07/20/18  08:00    


 


Ur Specific Gravity  1.017  (1.001-1.035)   07/20/18  08:00    


 


Urine Protein  Negative  (NEGATIVE)   07/20/18  08:00    


 


Urine Glucose (UA)  Negative  (NEGATIVE)   07/20/18  08:00    


 


Urine Ketones  Negative  (NEGATIVE)   07/20/18  08:00    


 


Urine Blood  Negative  (NEGATIVE)   07/20/18  08:00    


 


Urine Nitrite  Negative  (NEGATIVE)   07/20/18  08:00    


 


Urine Bilirubin  Negative  (<2.0 mg/dL)   07/20/18  08:00    


 


Urine Urobilinogen  Negative mg/dL (0.2-1.0)   07/20/18  08:00    


 


Ur Leukocyte Esterase  3+  (NEGATIVE)  H D 07/20/18  08:00    


 


Urine WBC (Auto)  None /hpf (3-5)   07/20/18  08:00    


 


Urine RBC (Auto)  1 /hpf (0-3)   07/20/18  08:00    


 


Ur Epithelial Cells  Rare /HPF (FEW)   07/20/18  08:00    


 


Urine Bacteria  Many /hpf (NONE SEEN)   07/18/18  22:40    


 


Hyaline Casts  105 /lpf  07/20/18  08:00    


 


Urine Mucus  Rare   07/20/18  08:00    


 


Urine Yeast  Few   07/18/18  22:40    


 


RPR Titer  Nonreactive  (NONREACTIVE)   07/19/18  06:00    








lab noted





- Treatment


Hospital Course: Detox Protocol Followed, Detoxed Safely, Responded well, 

Discharged Condition Good, Rehab Referral Accepted


Patient has Accepted a Rehab Referral to: [facility]





- Medication


Discharge Medications: 


Ambulatory Orders





Naproxen [Naprosyn -] 500 mg PO BID #20 tablet 10/02/15 


Pantoprazole Sodium [Protonix] 20 mg PO DAILY #30 tablet. 04/16/18 


Risperidone [Risperdal -] 1 mg PO DAILY #30 tablet 07/19/18 


Hydrochlorothiazide [Hctz -] 25 mg PO DAILY@0600 #14 tablet 07/22/18 


Metoprolol Tartrate [Lopressor -] 50 mg PO BID@0600,1800 #30 tablet 07/22/18 











- Diagnosis


(1) Alcohol dependence with withdrawal


Status: Acute   


Qualifiers: 


   Complication of substance-induced condition: uncomplicated   Qualified Code(s

): F10.230 - Alcohol dependence with withdrawal, uncomplicated   





(2) Essential hypertension


Status: Chronic   





(3) Nicotine dependence


Status: Acute   


Qualifiers: 


   Nicotine product type: cigarettes   Substance use status: in withdrawal   

Qualified Code(s): F17.213 - Nicotine dependence, cigarettes, with withdrawal   





(4) Weight loss


Status: Acute   





(5) Eczema


Status: Chronic   


Qualifiers: 


   Eczema type: unspecified   Qualified Code(s): L30.9 - Dermatitis, 

unspecified   





- AMA


Did Patient Leave Against Medical Advice: No

## 2018-07-22 NOTE — PN
[facility] Progress Note


Note: 





Psychiatrist on call note: 


Called by nursing staff to order medication for newly admitted patient from 

Detox. Medication reconciliation done. Risperdal 1 mg po daily ordered. I was 

also asked to place an order for Vistaril 25 mg po Q 4hrs prn for anxiety
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
## 2018-07-23 NOTE — HP
Psychiatrist Admission





- Data


Date of interview: 07/23/18


Admission source: Self-referred


Identifying data: This is the [facility] 

admission for this 53 years old single Black female, unemployed, domiciled


Medical History: Significant for hypertension, dyslipidemia, heart murmur, PPD+ 

and GERD. Smokes cigarettes 1.5 ppd


Vital Signs: 


 Vital Signs - 24 hr











  07/22/18 07/23/18 07/23/18





  11:54 00:30 03:30


 


Temperature 97.3 F L  


 


Pulse Rate 76  


 


Respiratory 16 18 18





Rate   


 


Blood Pressure 115/70  














  07/23/18





  07:29


 


Temperature 97.4 F L


 


Pulse Rate 64


 


Respiratory 18





Rate 


 


Blood Pressure 125/82











Allergies/Adverse Reactions: 


 Allergies











Allergy/AdvReac Type Severity Reaction Status Date / Time


 


aspirin Allergy Intermediate  Verified 07/18/18 14:40


 


lactose AdvReac Severe LACTOSE Verified 07/18/18 16:06





   INTOLERANCE  


 


Lactose-Intolerance AdvReac Severe  Uncoded 07/18/18 14:40











Date of last physical exam: 07/22/18


Concur with the findings of this exam: Yes





- Substance Abuse/Tx History


Hx Alcohol Use: Yes


Hx Substance Use: Yes


Substance Use Type: Alcohol (Started drinking alcohol at age 16, consumes 2 

pints of vodka daily. Last drank on 7/17/18), Cocaine (Started smoking crack 

cocaine at age 19, consumes $200 worth daily. Last smoked on 7/17/18), 

Marijuana (Started smoking marijuana at age 13, consumes $20 worth 3-6 times 

weekly. Last smoked on 7/17/18)


Hx Substance Use Treatment: Yes (3 previous inpt detox & one inpt rehab 

dmissions @ [facility])





Psychiatric Findings





- Problem List (Axis 1, 2,3)


(1) Alcohol dependence


Current Visit: Yes   Status: Acute   





(2) Cocaine dependence


Current Visit: No   Status: Acute   


Qualifiers: 


   Substance use status: uncomplicated   Qualified Code(s): F14.20 - Cocaine 

dependence, uncomplicated   


Comment: .   





(3) Cannabis dependence


Current Visit: No   Status: Acute   Comment: .   





(4) Nicotine dependence


Current Visit: No   Status: Chronic   


Qualifiers: 


   Nicotine product type: cigarettes   Substance use status: in withdrawal   

Qualified Code(s): F17.213 - Nicotine dependence, cigarettes, with withdrawal   


Comment: .

## 2018-07-24 NOTE — PN
BHS Progress Note


Note: 





Psychiatric nurse practitioner note:


-Delayed entry:


Call received by RN @ 10:45 am stating patient is complaining of anxiety. Chart 

reviewed. Dr. Trotter and Dr. Urbano's note read and appreciated. Will order 

vistaril 25mg q4h. Verbal consent given.

## 2018-08-20 NOTE — PN
Psychiatric Progress Note


Vital Signs: 


 Vital Signs











 Period  Temp  Pulse  Resp  BP Sys/Clifton  Pulse Ox


 


 Last 24 Hr  97.9 F-98.2 F  54-67  17-18  121-147/76-80  











Date of Session: 08/20/18


Chief Complaint:: Discharge visit


HPI: Patient addressed Alcohol,Cocaine dependence comorbid with Substance 

induced mood disorder.


ROS: Eczema,HTN,GERD.


Current Medications: 


Active Medications











Generic Name Dose Route Start Last Admin





  Trade Name Freq  PRN Reason Stop Dose Admin


 


Acetaminophen  650 mg  07/22/18 12:30  07/30/18 13:32





  Tylenol -  PO   650 mg





  Q4H PRN   Administration





  FEVER   





     





     





     


 


Al Hydroxide/Mg Hydroxide  30 ml  07/22/18 12:30  





  Mylanta Oral Suspension -  PO   





  Q6H PRN   





  DYSPEPSIA   





     





     





     


 


Eucalyptus/Menthol/Phenol/Sorbitol  1 each  07/22/18 12:30  





  Cepastat Lozenge -  MM   





  Q4H PRN   





  SORE THROAT   





     





     





     


 


Guaifenesin  10 ml  07/22/18 12:30  





  Robitussin Dm -  PO   





  Q6H PRN   





  COUGH   





     





     





     


 


Hydrochlorothiazide  25 mg  07/23/18 06:00  08/20/18 06:05





  Hctz -  PO   25 mg





  DAILY@0600 [facility]   Administration





     





     





     





     


 


Hydroxyzine Pamoate  25 mg  07/24/18 10:53  07/25/18 10:23





  Vistaril -  PO   25 mg





  Q4H PRN   Administration





  ANXIETY   





     





     





     


 


Loperamide HCl  4 mg  07/22/18 12:30  





  Imodium -  PO   





  Q6H PRN   





  DIARRHEA   





     





     





     


 


Magnesium Citrate  300 ml  07/22/18 12:30  





  Citroma -  PO   





  Q48H PRN   





  CONSTIPATION   





     





     





     


 


Magnesium Hydroxide  30 ml  07/22/18 12:30  





  Milk Of Magnesia -  PO   





  DAILY PRN   





  CONSTIPATION   





     





     





     


 


Melatonin  5 mg  07/22/18 22:00  07/31/18 22:06





  Melatonin  PO   5 mg





  HS PRN   Administration





  INSOMNIA   





     





     





     


 


Metoprolol Tartrate  50 mg  07/22/18 18:00  08/20/18 07:41





  Lopressor -  PO   50 mg





  BID@0600,1800 [facility]   Administration





     





     





     





     


 


Nicotine  14 mg  07/22/18 12:30  





  Nicoderm Patch -  TD   





  DAILY PRN   





  WITHDRAWAL(CONT SUBST)   





     





     





     


 


Nicotine Polacrilex  2 mg  07/22/18 12:30  





  Nicorette Gum -  BUC   





  Q2H PRN   





  NICOTINE REPLACEMENT RX   





     





     





     


 


Prenatal Multivit/Folic Acid/Iron  1 tab  07/23/18 10:00  08/19/18 10:09





  Prenatal Vitamins (Sjr) -  PO   1 tab





  DAILY JEANNIE   Administration





     





     





     





     


 


Pseudoephedrine/Triprolidine  1 combo  07/22/18 12:30  





  Actifed -  PO   





  TID PRN   





  NASAL CONGESTION   





     





     





     


 


Ranitidine HCl  150 mg  07/22/18 22:00  08/19/18 21:16





  Zantac -  PO   150 mg





  BID JEANNIE   Administration





     





     





     





     


 


Risperidone  1 mg  07/23/18 10:00  08/19/18 10:09





  Risperdal -  PO   1 mg





  DAILY JEANNIE   Administration





     





     





     





     


 


Thiamine HCl  100 mg  07/22/18 22:00  08/19/18 21:16





  Vitamin B1 -  PO   100 mg





  HS JEANNIE   Administration





     





     





     





     











Current Side Effect: No


Lab tests ordered: No


Lab tests reviewed: Yes


Provider note:: Patient completed this program today.She has met her treatment 

goals and will continue to address her issues  on outpatient basis .Patient 

continues to find that current medications help to cope with mood instability,

insomnia,anxiety.Script for Risperdal 1 mg po hs one month supply provided.  

Supportive therapy provided.  Patient is stable for discharge today.


Total face to face time:: 30





Psychiatric Treatment Plan





- Problem List


(1) Alcohol dependence


Comment: .   








(3) Cocaine dependence


Qualifiers: 


   Substance use status: uncomplicated   Qualified Code(s): F14.20 - Cocaine 

dependence, uncomplicated   


Comment: .   





(4) Eczema


Qualifiers: 


   Eczema type: unspecified   Qualified Code(s): L30.9 - Dermatitis, 

unspecified   


Comment: .   





(5) Essential hypertension


Comment: .   





(6) G6PD deficiency


Comment: .   





(7) Hypercholesterolemia


Comment: .   





(8) Nicotine dependence


Qualifiers: 


   Nicotine product type: cigarettes   Substance use status: in withdrawal   

Qualified Code(s): F17.213 - Nicotine dependence, cigarettes, with withdrawal   


Comment: .

## 2019-04-16 ENCOUNTER — HOSPITAL ENCOUNTER (INPATIENT)
Dept: HOSPITAL 74 - YASAS | Age: 54
LOS: 4 days | Discharge: OTHER INSTITUTION | End: 2019-04-20
Attending: SURGERY | Admitting: SURGERY
Payer: COMMERCIAL

## 2019-04-16 DIAGNOSIS — I10: ICD-10-CM

## 2019-04-16 DIAGNOSIS — F25.9: ICD-10-CM

## 2019-04-16 DIAGNOSIS — Z91.14: ICD-10-CM

## 2019-04-16 DIAGNOSIS — E78.5: ICD-10-CM

## 2019-04-16 DIAGNOSIS — Z91.011: ICD-10-CM

## 2019-04-16 DIAGNOSIS — B35.1: ICD-10-CM

## 2019-04-16 DIAGNOSIS — L30.9: ICD-10-CM

## 2019-04-16 DIAGNOSIS — F43.10: ICD-10-CM

## 2019-04-16 DIAGNOSIS — F14.20: ICD-10-CM

## 2019-04-16 DIAGNOSIS — F12.20: ICD-10-CM

## 2019-04-16 DIAGNOSIS — F17.213: ICD-10-CM

## 2019-04-16 DIAGNOSIS — F10.230: Primary | ICD-10-CM

## 2019-04-16 DIAGNOSIS — M19.90: ICD-10-CM

## 2019-04-16 DIAGNOSIS — F19.24: ICD-10-CM

## 2019-04-16 DIAGNOSIS — D55.0: ICD-10-CM

## 2019-04-16 DIAGNOSIS — F31.9: ICD-10-CM

## 2019-04-16 DIAGNOSIS — K21.9: ICD-10-CM

## 2019-04-16 DIAGNOSIS — Z88.6: ICD-10-CM

## 2019-04-16 DIAGNOSIS — R01.1: ICD-10-CM

## 2019-04-17 LAB
ALBUMIN SERPL-MCNC: 3.7 G/DL (ref 3.4–5)
ALP SERPL-CCNC: 52 U/L (ref 45–117)
ALT SERPL-CCNC: 43 U/L (ref 13–61)
ANION GAP SERPL CALC-SCNC: 6 MMOL/L (ref 8–16)
AST SERPL-CCNC: 65 U/L (ref 15–37)
BILIRUB SERPL-MCNC: 0.5 MG/DL (ref 0.2–1)
BUN SERPL-MCNC: 16 MG/DL (ref 7–18)
CALCIUM SERPL-MCNC: 9.7 MG/DL (ref 8.5–10.1)
CHLORIDE SERPL-SCNC: 107 MMOL/L (ref 98–107)
CO2 SERPL-SCNC: 30 MMOL/L (ref 21–32)
CREAT SERPL-MCNC: 0.9 MG/DL (ref 0.55–1.3)
DEPRECATED RDW RBC AUTO: 13.6 % (ref 11.6–15.6)
GLUCOSE SERPL-MCNC: 106 MG/DL (ref 74–106)
HCT VFR BLD CALC: 38.1 % (ref 32.4–45.2)
HGB BLD-MCNC: 13.1 GM/DL (ref 10.7–15.3)
MCH RBC QN AUTO: 31.7 PG (ref 25.7–33.7)
MCHC RBC AUTO-ENTMCNC: 34.4 G/DL (ref 32–36)
MCV RBC: 92.2 FL (ref 80–96)
PLATELET # BLD AUTO: 262 K/MM3 (ref 134–434)
PMV BLD: 9.7 FL (ref 7.5–11.1)
POTASSIUM SERPLBLD-SCNC: 3.4 MMOL/L (ref 3.5–5.1)
PROT SERPL-MCNC: 7.2 G/DL (ref 6.4–8.2)
RBC # BLD AUTO: 4.14 M/MM3 (ref 3.6–5.2)
SODIUM SERPL-SCNC: 143 MMOL/L (ref 136–145)
WBC # BLD AUTO: 7.5 K/MM3 (ref 4–10)

## 2019-04-17 PROCEDURE — HZ2ZZZZ DETOXIFICATION SERVICES FOR SUBSTANCE ABUSE TREATMENT: ICD-10-PCS | Performed by: SURGERY

## 2019-04-17 RX ADMIN — NICOTINE SCH: 21 PATCH TRANSDERMAL at 10:41

## 2019-04-17 RX ADMIN — HYDROCHLOROTHIAZIDE SCH MG: 25 TABLET ORAL at 12:09

## 2019-04-17 RX ADMIN — GUAIFENESIN SCH ML: 100 SOLUTION ORAL at 10:41

## 2019-04-17 RX ADMIN — Medication SCH MG: at 22:04

## 2019-04-17 RX ADMIN — BACLOFEN SCH MG: 10 TABLET ORAL at 22:04

## 2019-04-17 RX ADMIN — Medication SCH TAB: at 10:41

## 2019-04-17 RX ADMIN — GUAIFENESIN SCH ML: 100 SOLUTION ORAL at 03:56

## 2019-04-17 RX ADMIN — GUAIFENESIN SCH ML: 100 SOLUTION ORAL at 17:37

## 2019-04-17 RX ADMIN — NAPROXEN SCH MG: 500 TABLET ORAL at 22:04

## 2019-04-17 RX ADMIN — BACLOFEN SCH MG: 10 TABLET ORAL at 14:18

## 2019-04-17 RX ADMIN — NAPROXEN SCH MG: 500 TABLET ORAL at 14:18

## 2019-04-17 RX ADMIN — METOPROLOL TARTRATE SCH MG: 50 TABLET, FILM COATED ORAL at 17:37

## 2019-04-17 NOTE — PN
S CIWA





- CIWA Score


Nausea/Vomitin-No Nausea/No Vomiting


Muscle Tremors: 3


Anxiety: 3


Agitation: 4-Moderately Restless


Paroxysmal Sweats: 3


Orientation: 0-Oriented


Tacttile Disturbances: 0-None


Auditory Disturbances: 0-None


Visual Disturbances: 0-None


Headache: 0-None Present


CIWA-Ar Total Score: 13





BHS Progress Note (SOAP)


Subjective: 





muscle aches/spasms


body aches


sweats


shakes


interrupted sleep


anxiety


Objective: 





19 12:19


 Vital Signs











Temperature  99.1 F   19 09:41


 


Pulse Rate  90   19 09:41


 


Respiratory Rate  20   19 09:41


 


Blood Pressure  141/88   19 09:41


 


O2 Sat by Pulse Oximetry (%)      








labs pending


aaox3


ambulating


no acute distress


Assessment: 





19 12:20


withdrawal sx


Plan: 





continue detox


increase fluids


baclofen 10mg tid


motrin 600mg prn

## 2019-04-17 NOTE — EKG
Test Reason : 

Blood Pressure : ***/*** mmHG

Vent. Rate : 088 BPM     Atrial Rate : 088 BPM

   P-R Int : 130 ms          QRS Dur : 084 ms

    QT Int : 342 ms       P-R-T Axes : 067 068 -13 degrees

   QTc Int : 413 ms

 

NORMAL SINUS RHYTHM

T WAVE ABNORMALITY, CONSIDER INFERIOR ISCHEMIA

ABNORMAL ECG

WHEN COMPARED WITH ECG OF 18-JUL-2018 17:13,

T WAVE INVERSION NOW EVIDENT IN INFERIOR LEADS

Confirmed by PASCALE VASQUEZ, MANDO ([id number]) on 4/17/2019 12:23:58 PM

 

Referred By:             Confirmed By:MANDO ASHRAF MD

## 2019-04-17 NOTE — HP
CIWA Score


Nausea/Vomitin-No Nausea/No Vomiting


Muscle Tremors: 4-Moderate,w/Arms Extend


Anxiety: 4-Mod. Anxious/Guarded


Agitation: 1-Slight > Activity


Paroxysmal Sweats: 3 (Increased facial moisture)


Orientation: 0-Oriented


Tacttile Disturbances: 0-None


Auditory Disturbances: 0-None


Visual Disturbances: 0-None


Headache: 0-None Present


CIWA-Ar Total Score: 12





- Admission Criteria


[facility] Guidelines: Admission for Medically Managed Detox: 


Requires at least one of the followin. CIWA greater than 12


2. Seizures within the past 24 hours


3. Delirium tremens within the past 24 hours


4. Hallucinations within the past 24 hours


5. Acute intervention needed for co  occurring medical disorder


6. Acute intervention needed for co  occurring psychiatric disorder


7. Severe withdrawal that cannot be handled at a lower level of care (continued


    vomiting, continued diarrhea, abnormal vital signs) requiring intravenous


    medication and/or fluids


8. Pregnancy





Patient presents the following: CIWA greater than 12


Admission Criteria Met: Admission criteria met





Admission ROS [facility]


Chief Complaint: 





Here with alcohol withdrawal.


Allergies/Adverse Reactions: 


 Allergies











Allergy/AdvReac Type Severity Reaction Status Date / Time


 


aspirin Allergy Intermediate  Verified 18 18:01


 


lactose AdvReac Severe LACTOSE Verified 18 16:06





   INTOLERANCE  


 


Lactose-Intolerance AdvReac Severe  Uncoded 18 14:40











History of Present Illness: 





Here for alcohol detox. I also need help w/ crack/cocaine.





Crack/cocaine use began at age 19.


Alcohol use began at age 16.


Marijuana use began at age 13.





Nicotine use began at age 13.





Hx; blackouts (2014); overdose w/ crack/cocaine 2016





Denies seizures.





Longest period of sobriety 6 years





PMHx: HTN, High Cholesterol, arthritis legs, 





MHHx: Depression, bipolar disorder. Denies thoughts of harming self or others. 

Last saw  Provider 1 week ago.

















Search Terms: Odessa Rinaldi, 1965 


Search Date: 2019 01:22:34 AM 


The Drug Utilization Report below displays all of the controlled substance 

prescriptions, if any, that your patient has filled in the last twelve months. 

The information displayed on this report is compiled from pharmacy submissions 

to the Department, and accurately reflects the information as submitted by the 

pharmacies.


This report was requested by: Zaira Rudd | Reference #: [id number] 


There are no results for the search terms that you entered.


 


Exam Limitations: No Limitations





- Ebola screening


Have you traveled outside of the country in the last 21 days: No (N)


Have you had contact with anyone from an Ebola affected area: No


Have you been sick,other than usual withdrawal symptoms: No (No measle exposure)


Do you have a fever: No





- Review of Systems


Constitutional: Chills


EENT: reports: No Symptoms Reported, Blurred Vision, Nose Congestion, Dental 

Problems (Missing teeth. Chews and swallows okay.)


Respiratory: reports: Cough (Cough x 2 days), SOB with Exertion


Cardiac: reports: Other (Hx murmur)


GI: reports: Indigestion (heart burn/acid reflux)


: reports: No Symptoms Reported


Musculoskeletal: reports: Joint Pain (Knees and ankle pain 'pins & needles'  r/

t arthritis. Increases w/ walking,  rain. Improves w/ medication, resting)


Integumentary: reports: No Symptoms Reported


Neuro: reports: Tremors


Endocrine: reports: No Symptoms Reported


Hematology: reports: No Symptoms Reported


Psychiatric: reports: Judgement Intact, Orientated x3, Agitated, Anxious, 

Depressed





Patient History





- Patient Medical History


Hx Anemia: No


Hx Asthma: No


Hx Chronic Obstructive Pulmonary Disease (COPD): No


Hx Cancer: No


Hx Cardiac Disorders: Yes (Heart murmur)


Hx Congestive Heart Failure: No (hjeart murmur )


Hx Hypertension: No


Hx Hypercholesterolemia: Yes (on med)


Hx Pacemaker: No


HX Cerebrovascular Accident: No


Hx Seizures: No


Hx Dementia: No


Hx Diabetes: No


Hx Gastrointestinal Disorders: Yes (Acid reflux)


Hx Liver Disease: No


Hx Genitourinary Disorders: No


Hx Sexually Transmitted Disorders: No


Hx Renal Disease (ESRD): No


Hx Thyroid Disease: No


Hx Human Immunodeficiency Virus (HIV): No (last  negative)


Hx Hepatitis C: No


Hx Depression: Yes


Hx Suicide Attempt: No


Hx Bipolar Disorder: Yes (non compliance)


Hx Schizophrenia: No





- Patient Surgical History


Past Surgical History: No


Hx Neurologic Surgery: No


Hx Cataract Extraction: No


Hx Cardiac Surgery: No


Hx Lung Surgery: No


Hx Breast Surgery: No


Hx Breast Biopsy: No


Hx Abdominal Surgery: No


Hx Appendectomy: No


Hx Cholecystectomy: No


Hx Genitourinary Surgery: No


Hx  Section: No


Hx Orthopedic Surgery: No


Anesthesia Reaction: No





- Reproductive History


Last Menstrual Period: 09





- Smoking Cessation


Smoking history: Current every day smoker


Have you smoked in the past 12 months: Yes


Aproximately how many cigarettes per day: 30


Hx Chewing Tobacco Use: No


Initiated information on smoking cessation: Yes


'Breaking Loose' booklet given: 19





- Substance & Tx. History


Hx Alcohol Use: Yes


Hx Substance Use: Yes


Substance Use Type: Alcohol, Cocaine, Marijuana


Hx Substance Use Treatment: Yes (detox, rehab, long-term)





- Substances abused


  ** Crack


Substance route: Smoking





Family Disease History





- Family Disease History


Family Disease History: Other: Father (alcohol,), Mother ()





Admission Physical Exam [facility]





- Physical


General Appearance: Yes: Nourished, Appropriately Dressed, Mild Distress, 

Tremorous, Sweating (Increased facial moisture)


HEENTM: Yes: Normocephalic, Normal Voice, BROOK, Pharynx Normal (No lesions), 

Other (Dry ;lips, thickened saliva)


Respiratory: Yes: Lungs Clear, Normal Breath Sounds, No Accessory Muscle Use, 

Other (Cough produvtive of thick yellowish/tannish phlegm)


Neck: Yes: No masses,lesions,Nodules, Supple


Breast: Yes: Breast Exam Deferred


Cardiology: Yes: Regular Rhythm, Regular Rate, S1, S2, Murmur


Abdominal: Yes: Non Tender, Soft, Increased Bowel Sounds


Genitourinary: Yes: Within Normal Limits


Back: Yes: Within Normal Limits


Musculoskeletal: Yes: full range of Motion, Other (Crepitus (R) knee. FROM/FWB/

Non-tender)


Extremities: Yes: Normal Capillary Refill, Normal Range of Motion, Tremors


Neurological: Yes: CNs II-XII NML intact, Fully Oriented, Alert, Motor Strength 

5/5


Integumentary: Yes: Normal Color, Dry (Dry lips, mucous membranes and decreased 

skin turgor), Warm, Diaphoresis (Increased facial moisture), Other (Elogagted 

toe nail (L) second toe, with tenderness at tip)


Lymphatic: Yes: Within Normal Limits





- Diagnostic


(1) Alcohol dependence with withdrawal


Current Visit: Yes   Status: Acute   


Qualifiers: 


   Complication of substance-induced condition: uncomplicated   Qualified Code(s

): F10.230 - Alcohol dependence with withdrawal, uncomplicated   





(2) Dehydration


Current Visit: Yes   Status: Acute   





(3) Cannabis dependence


Current Visit: Yes   Status: Chronic   Comment: .   





(4) Cocaine dependence


Current Visit: Yes   Status: Chronic   


Qualifiers: 


   Substance use status: uncomplicated   Qualified Code(s): F14.20 - Cocaine 

dependence, uncomplicated   


Comment: .   





(5) Essential hypertension


Current Visit: Yes   Status: Chronic   Comment: .   





(6) Nicotine dependence


Current Visit: Yes   Status: Chronic   


Qualifiers: 


   Nicotine product type: cigarettes   Substance use status: in withdrawal   

Qualified Code(s): F17.213 - Nicotine dependence, cigarettes, with withdrawal   


Comment: .   





(7) Toenail deformity


Current Visit: Yes   Status: Chronic   





(8) Toenail fungus


Current Visit: Yes   Status: Chronic   





Cleared for Admission S





- Detox or Rehab


S Level of Care: Medically Managed


Detox Regimen/Protocol: Librium





Inpatient Rehab Admission





- Rehab Decision to Admit


Inpatient rehab admission?: No

## 2019-04-18 RX ADMIN — NAPROXEN SCH MG: 500 TABLET ORAL at 22:11

## 2019-04-18 RX ADMIN — BACLOFEN SCH MG: 10 TABLET ORAL at 22:12

## 2019-04-18 RX ADMIN — HYDROCHLOROTHIAZIDE SCH MG: 25 TABLET ORAL at 06:25

## 2019-04-18 RX ADMIN — GUAIFENESIN SCH ML: 100 SOLUTION ORAL at 11:09

## 2019-04-18 RX ADMIN — BACLOFEN SCH MG: 10 TABLET ORAL at 14:21

## 2019-04-18 RX ADMIN — RISPERIDONE SCH MG: 1 TABLET, FILM COATED ORAL at 14:21

## 2019-04-18 RX ADMIN — NICOTINE SCH: 21 PATCH TRANSDERMAL at 11:10

## 2019-04-18 RX ADMIN — BACLOFEN SCH MG: 10 TABLET ORAL at 06:25

## 2019-04-18 RX ADMIN — GUAIFENESIN SCH ML: 100 SOLUTION ORAL at 06:27

## 2019-04-18 RX ADMIN — METOPROLOL TARTRATE SCH MG: 50 TABLET, FILM COATED ORAL at 06:25

## 2019-04-18 RX ADMIN — GUAIFENESIN SCH ML: 100 SOLUTION ORAL at 17:31

## 2019-04-18 RX ADMIN — PANTOPRAZOLE SODIUM SCH MG: 20 TABLET, DELAYED RELEASE ORAL at 11:10

## 2019-04-18 RX ADMIN — NAPROXEN SCH MG: 500 TABLET ORAL at 11:10

## 2019-04-18 RX ADMIN — Medication SCH MG: at 22:10

## 2019-04-18 RX ADMIN — POTASSIUM CHLORIDE SCH MEQ: 1500 TABLET, EXTENDED RELEASE ORAL at 11:10

## 2019-04-18 RX ADMIN — METOPROLOL TARTRATE SCH MG: 50 TABLET, FILM COATED ORAL at 17:31

## 2019-04-18 RX ADMIN — Medication SCH TAB: at 11:10

## 2019-04-18 NOTE — PN
S CIWA





- CIWA Score


Nausea/Vomitin-No Nausea/No Vomiting


Muscle Tremors: 3


Anxiety: 2


Agitation: 3


Paroxysmal Sweats: 2


Orientation: 0-Oriented


Tacttile Disturbances: 0-None


Auditory Disturbances: 0-None


Visual Disturbances: 0-None


Headache: 0-None Present


CIWA-Ar Total Score: 10





BHS Progress Note (SOAP)


Subjective: 





sweats


tired


interrupted sleep


body aches


Objective: 





19 13:00


 Vital Signs











Temperature  98.0 F   19 10:55


 


Pulse Rate  62   19 10:55


 


Respiratory Rate  18   19 10:55


 


Blood Pressure  112/74   19 10:55


 


O2 Sat by Pulse Oximetry (%)      








 Laboratory Tests











  19





  07:00 07:00 07:00


 


WBC  7.5  


 


RBC  4.14  


 


Hgb  13.1  


 


Hct  38.1  


 


MCV  92.2  


 


MCH  31.7  


 


MCHC  34.4  


 


RDW  13.6  


 


Plt Count  262  


 


MPV  9.7  


 


Sodium   143 


 


Potassium   3.4 L 


 


Chloride   107 


 


Carbon Dioxide   30 


 


Anion Gap   6 L 


 


BUN   16 


 


Creatinine   0.9 


 


Creat Clearance w eGFR   65.25 


 


Random Glucose   106 


 


Calcium   9.7 


 


Total Bilirubin   0.5 


 


AST   65 H 


 


ALT   43 


 


Alkaline Phosphatase   52 


 


Total Protein   7.2 


 


Albumin   3.7 


 


RPR Titer    Nonreactive








aaox3


ambulating


no acute distress


pt is currently getting kdur for hypokalemia; repeat lab order for potassium 


d/c kdur if repeat potassium is wnl





Assessment: 





19 13:07


withdrawal sx


Plan: 





continue detox


f/u pending repeated potassium order
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
## 2019-04-19 RX ADMIN — METOPROLOL TARTRATE SCH MG: 50 TABLET, FILM COATED ORAL at 06:08

## 2019-04-19 RX ADMIN — POTASSIUM CHLORIDE SCH MEQ: 1500 TABLET, EXTENDED RELEASE ORAL at 11:52

## 2019-04-19 RX ADMIN — NAPROXEN SCH MG: 500 TABLET ORAL at 22:20

## 2019-04-19 RX ADMIN — BACLOFEN SCH MG: 10 TABLET ORAL at 13:18

## 2019-04-19 RX ADMIN — NAPROXEN SCH MG: 500 TABLET ORAL at 11:52

## 2019-04-19 RX ADMIN — NICOTINE SCH: 21 PATCH TRANSDERMAL at 11:53

## 2019-04-19 RX ADMIN — BACLOFEN SCH MG: 10 TABLET ORAL at 06:08

## 2019-04-19 RX ADMIN — PANTOPRAZOLE SODIUM SCH MG: 20 TABLET, DELAYED RELEASE ORAL at 11:52

## 2019-04-19 RX ADMIN — BACLOFEN SCH MG: 10 TABLET ORAL at 22:19

## 2019-04-19 RX ADMIN — Medication SCH MG: at 22:21

## 2019-04-19 RX ADMIN — METOPROLOL TARTRATE SCH MG: 50 TABLET, FILM COATED ORAL at 22:20

## 2019-04-19 RX ADMIN — RISPERIDONE SCH MG: 1 TABLET, FILM COATED ORAL at 11:52

## 2019-04-19 RX ADMIN — HYDROCHLOROTHIAZIDE SCH MG: 25 TABLET ORAL at 06:08

## 2019-04-19 RX ADMIN — Medication SCH TAB: at 11:52

## 2019-04-19 NOTE — PN
[facility] Progress Note


Note: 





 Vital Signs











Temperature  96.3 F L  04/19/19 11:40


 


Pulse Rate  63   04/19/19 11:40


 


Respiratory Rate  16   04/19/19 11:40


 


Blood Pressure  119/74   04/19/19 11:40


 


O2 Sat by Pulse Oximetry (%)      








 Laboratory Last Values











WBC  7.5 K/mm3 (4.0-10.0)   04/17/19  07:00    


 


RBC  4.14 M/mm3 (3.60-5.2)   04/17/19  07:00    


 


Hgb  13.1 GM/dL (10.7-15.3)   04/17/19  07:00    


 


Hct  38.1 % (32.4-45.2)   04/17/19  07:00    


 


MCV  92.2 fl (80-96)   04/17/19  07:00    


 


MCH  31.7 pg (25.7-33.7)   04/17/19  07:00    


 


MCHC  34.4 g/dl (32.0-36.0)   04/17/19  07:00    


 


RDW  13.6 % (11.6-15.6)   04/17/19  07:00    


 


Plt Count  262 K/MM3 (134-434)   04/17/19  07:00    


 


MPV  9.7 fl (7.5-11.1)   04/17/19  07:00    


 


Sodium  143 mmol/L (136-145)   04/17/19  07:00    


 


Potassium  3.8 mmol/L (3.5-5.1)   04/19/19  07:00    


 


Chloride  107 mmol/L ()   04/17/19  07:00    


 


Carbon Dioxide  30 mmol/L (21-32)   04/17/19  07:00    


 


Anion Gap  6 MMOL/L (8-16)  L  04/17/19  07:00    


 


BUN  16 mg/dL (7-18)   04/17/19  07:00    


 


Creatinine  0.9 mg/dL (0.55-1.3)   04/17/19  07:00    


 


Creat Clearance w eGFR  65.25  (>60)   04/17/19  07:00    


 


Random Glucose  106 mg/dL ()   04/17/19  07:00    


 


Calcium  9.7 mg/dL (8.5-10.1)   04/17/19  07:00    


 


Total Bilirubin  0.5 mg/dL (0.2-1)   04/17/19  07:00    


 


AST  65 U/L (15-37)  H  04/17/19  07:00    


 


ALT  43 U/L (13-61)   04/17/19  07:00    


 


Alkaline Phosphatase  52 U/L ()   04/17/19  07:00    


 


Total Protein  7.2 g/dl (6.4-8.2)   04/17/19  07:00    


 


Albumin  3.7 g/dl (3.4-5.0)   04/17/19  07:00    


 


RPR Titer  Nonreactive  (NONREACTIVE)   04/17/19  07:00    








interrupted, sleep fatigue 





AOX3 no acute distress, ambulating in the unit 





alcohol withdrawal sx 








labs noted : Elevated AST d/c acetaminophen


Increase PO fluids 


continue to monitor 


Patient to follow up with primary care provider upon discharge

## 2019-04-20 ENCOUNTER — HOSPITAL ENCOUNTER (INPATIENT)
Dept: HOSPITAL 74 - YASAS | Age: 54
LOS: 1 days | Discharge: HOME | DRG: 772 | End: 2019-04-21
Attending: NEUROMUSCULOSKELETAL MEDICINE & OMM | Admitting: NEUROMUSCULOSKELETAL MEDICINE & OMM
Payer: COMMERCIAL

## 2019-04-20 VITALS — DIASTOLIC BLOOD PRESSURE: 86 MMHG | SYSTOLIC BLOOD PRESSURE: 137 MMHG | HEART RATE: 58 BPM | TEMPERATURE: 98.6 F

## 2019-04-20 DIAGNOSIS — F12.20: ICD-10-CM

## 2019-04-20 DIAGNOSIS — F14.20: ICD-10-CM

## 2019-04-20 DIAGNOSIS — F10.20: Primary | ICD-10-CM

## 2019-04-20 DIAGNOSIS — E86.0: ICD-10-CM

## 2019-04-20 DIAGNOSIS — I10: ICD-10-CM

## 2019-04-20 DIAGNOSIS — F17.213: ICD-10-CM

## 2019-04-20 DIAGNOSIS — B35.1: ICD-10-CM

## 2019-04-20 PROCEDURE — HZ42ZZZ GROUP COUNSELING FOR SUBSTANCE ABUSE TREATMENT, COGNITIVE-BEHAVIORAL: ICD-10-PCS | Performed by: NEUROMUSCULOSKELETAL MEDICINE & OMM

## 2019-04-20 RX ADMIN — METOPROLOL TARTRATE SCH MG: 50 TABLET, FILM COATED ORAL at 05:27

## 2019-04-20 RX ADMIN — HYDROCHLOROTHIAZIDE SCH MG: 25 TABLET ORAL at 05:27

## 2019-04-20 RX ADMIN — BACLOFEN SCH MG: 10 TABLET ORAL at 05:27

## 2019-04-20 RX ADMIN — PANTOPRAZOLE SODIUM SCH MG: 20 TABLET, DELAYED RELEASE ORAL at 10:18

## 2019-04-20 RX ADMIN — NAPROXEN SCH MG: 500 TABLET ORAL at 10:17

## 2019-04-20 RX ADMIN — RISPERIDONE SCH MG: 1 TABLET, FILM COATED ORAL at 10:17

## 2019-04-20 RX ADMIN — Medication SCH TAB: at 10:18

## 2019-04-20 RX ADMIN — NICOTINE SCH: 21 PATCH TRANSDERMAL at 10:18

## 2019-04-20 RX ADMIN — METOPROLOL TARTRATE SCH MG: 50 TABLET, FILM COATED ORAL at 18:12

## 2019-04-20 RX ADMIN — POTASSIUM CHLORIDE SCH MEQ: 1500 TABLET, EXTENDED RELEASE ORAL at 10:17

## 2019-04-20 NOTE — HP
[facility] MD Rehab Assess/Revision





- Admission History


Admitted to Rehab from: [facility]


Date of Admission to Rehab: 4/20/2019





- Vital signs


Vital Signs: 





Noted and stable





- Findings


Detox History & Physical reviewed: Yes


Concur with findings: Yes


Comments/Additional Findings: Pt is medically stable for discharge to rehab.





Inpatient Rehab Admission





- Rehab Decision to Admit


Inpatient rehab admission?: Yes





- Initial Determination


Are CD services needed?: Yes


Free of communicable disease: Yes


Not in need of hospitalization: Yes





- Rehab Admission Criteria


Previous failed treatment: Yes


Poor recovery environment: Yes


Comorbidities: Yes


Lacks judgement: No


Patient is meeting Inpatient Rehab admission criteria:: Yes

## 2019-04-21 VITALS — HEART RATE: 56 BPM | SYSTOLIC BLOOD PRESSURE: 136 MMHG | DIASTOLIC BLOOD PRESSURE: 82 MMHG

## 2019-04-21 VITALS — TEMPERATURE: 97.7 F

## 2019-04-21 RX ADMIN — METOPROLOL TARTRATE SCH MG: 50 TABLET, FILM COATED ORAL at 06:43

## 2019-04-21 NOTE — PN
[facility] Progress Note


Note: 


 REHAB DISCHARGE - AMA





Patient left AMA without waiting for Provider, stating I don't want rehab.


Patient was a transfer from detox, where admitted and treated for alcohol use 

disorder.


Patient had co-occurring nicotine, marijuana and crack/cocaine use disorder.





PMHx: HTN, High Cholesterol, arthritis legs, 


MHHx: Depression, bipolar disorder. Denied thoughts of harming self or others.





 Vital Signs - 24 hr











  04/21/19 04/21/19 04/21/19





  00:30 03:30 07:03


 


Temperature   97.7 F


 


Pulse Rate   89


 


Respiratory 18 18 16





Rate   


 


Blood Pressure   129/81














  04/21/19





  09:17


 


Temperature 


 


Pulse Rate 56 L


 


Respiratory 18





Rate 


 


Blood Pressure 136/82











 Laboratory Last Values











HIV 1&2 Antibody Screen  Negative   04/21/19  05:50    


 


HIV P24 Antigen  Negative   04/21/19  05:50    





Labs reviewed.


Patient left unit AMA.

## 2019-07-15 ENCOUNTER — EMERGENCY (EMERGENCY)
Facility: HOSPITAL | Age: 54
LOS: 1 days | Discharge: HOME | End: 2019-07-15
Attending: EMERGENCY MEDICINE | Admitting: EMERGENCY MEDICINE
Payer: MEDICAID

## 2019-07-15 VITALS
DIASTOLIC BLOOD PRESSURE: 89 MMHG | RESPIRATION RATE: 16 BRPM | TEMPERATURE: 99 F | SYSTOLIC BLOOD PRESSURE: 165 MMHG | OXYGEN SATURATION: 96 % | HEART RATE: 73 BPM

## 2019-07-15 VITALS
TEMPERATURE: 98 F | DIASTOLIC BLOOD PRESSURE: 95 MMHG | HEART RATE: 66 BPM | SYSTOLIC BLOOD PRESSURE: 165 MMHG | OXYGEN SATURATION: 99 % | RESPIRATION RATE: 18 BRPM | WEIGHT: 125 LBS

## 2019-07-15 PROCEDURE — 99284 EMERGENCY DEPT VISIT MOD MDM: CPT

## 2019-07-15 RX ORDER — HYDRALAZINE HCL 50 MG
25 TABLET ORAL ONCE
Refills: 0 | Status: COMPLETED | OUTPATIENT
Start: 2019-07-15 | End: 2019-07-15

## 2019-07-15 RX ADMIN — Medication 25 MILLIGRAM(S): at 14:35

## 2019-07-15 NOTE — ED PROVIDER NOTE - CLINICAL SUMMARY MEDICAL DECISION MAKING FREE TEXT BOX
Pt was BIBA from [facility] for high BP. /95 at triage. No other VS derangements. Exam unremarkable. give Hydralazine. Reassess. Pt was BIBA from [facility] for asymptomatic HTN and non adherent with medications for months.  . /95 at triage.  Sleepy, unkempt, no acute distress. No other VS derangements.  Slight flow murmur (old as per patient).  Lungs clear, non focal neurological exam.  Given oral hydralazine.  D/w patient the importance of close follow up with PMD and adherent with medication.  Conservative management discussed with the patient in detail.  Close PMD follow up encouraged.  Strict ED return instructions discussed in detail and patient given the opportunity to ask any questions about their discharge diagnosis and instructions

## 2019-07-15 NOTE — ED PROVIDER NOTE - NEUROLOGICAL, MLM
Alert and oriented, no focal deficits, no motor or sensory deficits. Sleepy but when awake follows basic commands, no facial asymmetry.  Speech slow but clear.

## 2019-07-15 NOTE — ED ADULT NURSE NOTE - NSIMPLEMENTINTERV_GEN_ALL_ED
Implemented All Universal Safety Interventions:  [place] to call system. Call bell, personal items and telephone within reach. Instruct patient to call for assistance. Room bathroom lighting operational. Non-slip footwear when patient is off stretcher. Physically safe environment: no spills, clutter or unnecessary equipment. Stretcher in lowest position, wheels locked, appropriate side rails in place.

## 2019-07-15 NOTE — ED PROVIDER NOTE - OBJECTIVE STATEMENT
54 y o female with PMHX of HTN and HLD was BIBA for hypertension. States she was told she had high BP at the [facility]. Pt also c/o bilateral ankle swelling. Admits to drinking alcohol last night. Pt is a smoker. No chest pain, SOB, N/V/D. /95 at triage. 54 y o female with PMHX of HTN and HLD was BIBA for hypertension. States she was told she had high BP at the [facility]. Pt also c/o bilateral ankle pain. Admits to drinking alcohol last night. Pt is a smoker. No chest pain, SOB, N/V/D. /95 at triage.  Non adherent with medication for months

## 2019-07-15 NOTE — ED PROVIDER NOTE - CONSTITUTIONAL, MLM
normal... Well appearing, well nourished, awake, alert, oriented to person, place, time/situation and in no apparent distress. unkempt, sleepy and in no apparent distress.

## 2019-07-15 NOTE — ED ADULT TRIAGE NOTE - CHIEF COMPLAINT QUOTE
was told she was hypertensive at the [facility] clinic- also reports bilateral ankle swelling - is non compliant with meds for the last 3 months -

## 2019-07-15 NOTE — ED PROVIDER NOTE - NEURO NEGATIVE STATEMENT, MLM
no loss of consciousness, no headache, no sensory deficits, and no weakness. no headache, no sensory deficits, and no weakness.

## 2019-07-15 NOTE — ED ADULT NURSE NOTE - CHIEF COMPLAINT QUOTE
was told she was hypertensive at the [facility] clinic- also reports bilateral ankle swelling - is non compliant with meds for the last 3 months -

## 2019-07-19 DIAGNOSIS — Z88.6 ALLERGY STATUS TO ANALGESIC AGENT: ICD-10-CM

## 2019-07-19 DIAGNOSIS — I10 ESSENTIAL (PRIMARY) HYPERTENSION: ICD-10-CM

## 2019-11-18 NOTE — ED ADULT NURSE NOTE - NS ED NOTE  TALK SOMEONE YN
"November 18, 2019    Christina A Tomlinson  [address]  [place] [address]       [facility]                                                                                                                                                       [address].  [facility] [address]  Phone: [phone]  Fax: [phone] Serg Vasquez:    Serg Vasquez,    We received a notice of recent visit on 11/12 for your knee. Dr Castillo hasn't seen you since 2015 and really would like to   see you for a thorough physical/annual .  We hope you will be able to schedule an "annual" visit soon .     You can book online or call [phone] for assistance.    Sincerely,        Peace Parish MA for Dr Vinay Castillo  lb     "
No

## 2021-03-05 ENCOUNTER — HOSPITAL ENCOUNTER (INPATIENT)
Dept: HOSPITAL 74 - YASAS | Age: 56
LOS: 5 days | Discharge: OTHER INSTITUTION | End: 2021-03-10
Attending: ALLERGY & IMMUNOLOGY | Admitting: ALLERGY & IMMUNOLOGY
Payer: COMMERCIAL

## 2021-03-05 VITALS — BODY MASS INDEX: 22.6 KG/M2

## 2021-03-05 DIAGNOSIS — R01.1: ICD-10-CM

## 2021-03-05 DIAGNOSIS — Z88.6: ICD-10-CM

## 2021-03-05 DIAGNOSIS — L30.9: ICD-10-CM

## 2021-03-05 DIAGNOSIS — F43.10: ICD-10-CM

## 2021-03-05 DIAGNOSIS — F31.9: ICD-10-CM

## 2021-03-05 DIAGNOSIS — F17.210: ICD-10-CM

## 2021-03-05 DIAGNOSIS — Z91.011: ICD-10-CM

## 2021-03-05 DIAGNOSIS — E78.5: ICD-10-CM

## 2021-03-05 DIAGNOSIS — F10.230: Primary | ICD-10-CM

## 2021-03-05 DIAGNOSIS — Z62.810: ICD-10-CM

## 2021-03-05 DIAGNOSIS — F12.20: ICD-10-CM

## 2021-03-05 DIAGNOSIS — K21.9: ICD-10-CM

## 2021-03-05 DIAGNOSIS — F14.20: ICD-10-CM

## 2021-03-05 DIAGNOSIS — I10: ICD-10-CM

## 2021-03-05 DIAGNOSIS — F19.282: ICD-10-CM

## 2021-03-05 DIAGNOSIS — E86.0: ICD-10-CM

## 2021-03-05 DIAGNOSIS — Z86.2: ICD-10-CM

## 2021-03-05 DIAGNOSIS — M06.9: ICD-10-CM

## 2021-03-05 PROCEDURE — C9803 HOPD COVID-19 SPEC COLLECT: HCPCS

## 2021-03-05 PROCEDURE — HZ2ZZZZ DETOXIFICATION SERVICES FOR SUBSTANCE ABUSE TREATMENT: ICD-10-PCS | Performed by: ALLERGY & IMMUNOLOGY

## 2021-03-05 PROCEDURE — U0003 INFECTIOUS AGENT DETECTION BY NUCLEIC ACID (DNA OR RNA); SEVERE ACUTE RESPIRATORY SYNDROME CORONAVIRUS 2 (SARS-COV-2) (CORONAVIRUS DISEASE [COVID-19]), AMPLIFIED PROBE TECHNIQUE, MAKING USE OF HIGH THROUGHPUT TECHNOLOGIES AS DESCRIBED BY CMS-2020-01-R: HCPCS

## 2021-03-05 RX ADMIN — HYDROXYZINE PAMOATE SCH: 25 CAPSULE ORAL at 22:46

## 2021-03-05 RX ADMIN — HYDROXYZINE PAMOATE SCH MG: 25 CAPSULE ORAL at 18:24

## 2021-03-05 RX ADMIN — Medication SCH MG: at 22:45

## 2021-03-05 RX ADMIN — METOPROLOL TARTRATE SCH MG: 50 TABLET, FILM COATED ORAL at 18:24

## 2021-03-05 RX ADMIN — Medication SCH: at 22:46

## 2021-03-06 LAB
ALBUMIN SERPL-MCNC: 2.9 G/DL (ref 3.4–5)
ALP SERPL-CCNC: 50 U/L (ref 45–117)
ALT SERPL-CCNC: 26 U/L (ref 13–61)
ANION GAP SERPL CALC-SCNC: 4 MMOL/L (ref 8–16)
AST SERPL-CCNC: 17 U/L (ref 15–37)
BILIRUB SERPL-MCNC: 0.3 MG/DL (ref 0.2–1)
BUN SERPL-MCNC: 8.9 MG/DL (ref 7–18)
CALCIUM SERPL-MCNC: 9.2 MG/DL (ref 8.5–10.1)
CHLORIDE SERPL-SCNC: 109 MMOL/L (ref 98–107)
CO2 SERPL-SCNC: 31 MMOL/L (ref 21–32)
CREAT SERPL-MCNC: 0.7 MG/DL (ref 0.55–1.3)
DEPRECATED RDW RBC AUTO: 14.4 % (ref 11.6–15.6)
GLUCOSE SERPL-MCNC: 90 MG/DL (ref 74–106)
HCT VFR BLD CALC: 40.5 % (ref 32.4–45.2)
HGB BLD-MCNC: 13.5 GM/DL (ref 10.7–15.3)
HIV 1+2 AB+HIV1 P24 AG SERPL QL IA: NEGATIVE
MCH RBC QN AUTO: 31.7 PG (ref 25.7–33.7)
MCHC RBC AUTO-ENTMCNC: 33.3 G/DL (ref 32–36)
MCV RBC: 95.4 FL (ref 80–96)
PLATELET # BLD AUTO: 348 K/MM3 (ref 134–434)
PMV BLD: 10.1 FL (ref 7.5–11.1)
POTASSIUM SERPLBLD-SCNC: 4.3 MMOL/L (ref 3.5–5.1)
PROT SERPL-MCNC: 6.4 G/DL (ref 6.4–8.2)
RBC # BLD AUTO: 4.24 M/MM3 (ref 3.6–5.2)
SODIUM SERPL-SCNC: 144 MMOL/L (ref 136–145)
WBC # BLD AUTO: 5.7 K/MM3 (ref 4–10)

## 2021-03-06 RX ADMIN — METOPROLOL TARTRATE SCH MG: 50 TABLET, FILM COATED ORAL at 06:30

## 2021-03-06 RX ADMIN — NICOTINE SCH: 21 PATCH TRANSDERMAL at 10:01

## 2021-03-06 RX ADMIN — HYDROXYZINE PAMOATE SCH MG: 25 CAPSULE ORAL at 10:48

## 2021-03-06 RX ADMIN — HYDROXYZINE PAMOATE SCH MG: 25 CAPSULE ORAL at 06:30

## 2021-03-06 RX ADMIN — Medication SCH: at 23:39

## 2021-03-06 RX ADMIN — HYDROXYZINE PAMOATE SCH MG: 25 CAPSULE ORAL at 18:08

## 2021-03-06 RX ADMIN — FLUOCINONIDE SCH APPLIC: 0.5 CREAM TOPICAL at 10:01

## 2021-03-06 RX ADMIN — HYDROXYZINE PAMOATE SCH: 25 CAPSULE ORAL at 23:39

## 2021-03-06 RX ADMIN — METOPROLOL TARTRATE SCH MG: 50 TABLET, FILM COATED ORAL at 18:08

## 2021-03-06 RX ADMIN — PANTOPRAZOLE SODIUM SCH MG: 20 TABLET, DELAYED RELEASE ORAL at 10:01

## 2021-03-06 RX ADMIN — FLUOCINONIDE SCH: 0.5 CREAM TOPICAL at 23:39

## 2021-03-06 RX ADMIN — Medication SCH TAB: at 10:01

## 2021-03-06 RX ADMIN — HYDROXYZINE PAMOATE SCH: 25 CAPSULE ORAL at 13:03

## 2021-03-06 RX ADMIN — HYDROCHLOROTHIAZIDE SCH MG: 25 TABLET ORAL at 06:30

## 2021-03-07 LAB
APPEARANCE UR: CLEAR
BILIRUB UR STRIP.AUTO-MCNC: NEGATIVE MG/DL
COLOR UR: YELLOW
KETONES UR QL STRIP: NEGATIVE
LEUKOCYTE ESTERASE UR QL STRIP.AUTO: NEGATIVE
NITRITE UR QL STRIP: NEGATIVE
PH UR: 6.5 [PH] (ref 5–8)
PROT UR QL STRIP: NEGATIVE
PROT UR QL STRIP: NEGATIVE
SP GR UR: 1.02 (ref 1.01–1.03)
UROBILINOGEN UR STRIP-MCNC: 1 MG/DL (ref 0.2–1)

## 2021-03-07 RX ADMIN — Medication SCH: at 22:34

## 2021-03-07 RX ADMIN — FLUOCINONIDE SCH APPLIC: 0.5 CREAM TOPICAL at 22:34

## 2021-03-07 RX ADMIN — Medication SCH: at 12:18

## 2021-03-07 RX ADMIN — HYDROXYZINE PAMOATE SCH MG: 25 CAPSULE ORAL at 06:26

## 2021-03-07 RX ADMIN — NICOTINE SCH: 21 PATCH TRANSDERMAL at 12:18

## 2021-03-07 RX ADMIN — Medication SCH MG: at 22:34

## 2021-03-07 RX ADMIN — FLUOCINONIDE SCH: 0.5 CREAM TOPICAL at 12:18

## 2021-03-07 RX ADMIN — METOPROLOL TARTRATE SCH MG: 50 TABLET, FILM COATED ORAL at 17:41

## 2021-03-07 RX ADMIN — HYDROXYZINE PAMOATE SCH: 25 CAPSULE ORAL at 17:32

## 2021-03-07 RX ADMIN — HYDROCHLOROTHIAZIDE SCH MG: 25 TABLET ORAL at 06:24

## 2021-03-07 RX ADMIN — PANTOPRAZOLE SODIUM SCH: 20 TABLET, DELAYED RELEASE ORAL at 12:19

## 2021-03-07 RX ADMIN — HYDROXYZINE PAMOATE SCH: 25 CAPSULE ORAL at 12:19

## 2021-03-07 RX ADMIN — NIFEDIPINE SCH MG: 30 TABLET, EXTENDED RELEASE ORAL at 13:05

## 2021-03-07 RX ADMIN — HYDROXYZINE PAMOATE SCH: 25 CAPSULE ORAL at 13:07

## 2021-03-07 RX ADMIN — HYDROXYZINE PAMOATE SCH MG: 25 CAPSULE ORAL at 22:34

## 2021-03-07 RX ADMIN — METOPROLOL TARTRATE SCH: 50 TABLET, FILM COATED ORAL at 06:24

## 2021-03-08 RX ADMIN — HYDROXYZINE PAMOATE SCH: 25 CAPSULE ORAL at 10:30

## 2021-03-08 RX ADMIN — HYDROCHLOROTHIAZIDE SCH MG: 25 TABLET ORAL at 06:41

## 2021-03-08 RX ADMIN — Medication SCH MG: at 22:32

## 2021-03-08 RX ADMIN — HYDROXYZINE PAMOATE SCH MG: 25 CAPSULE ORAL at 22:32

## 2021-03-08 RX ADMIN — HYDROXYZINE PAMOATE SCH: 25 CAPSULE ORAL at 14:47

## 2021-03-08 RX ADMIN — NICOTINE SCH: 21 PATCH TRANSDERMAL at 10:27

## 2021-03-08 RX ADMIN — Medication SCH TAB: at 10:26

## 2021-03-08 RX ADMIN — FLUOCINONIDE SCH APPLIC: 0.5 CREAM TOPICAL at 10:26

## 2021-03-08 RX ADMIN — PANTOPRAZOLE SODIUM SCH MG: 20 TABLET, DELAYED RELEASE ORAL at 10:27

## 2021-03-08 RX ADMIN — METOPROLOL TARTRATE SCH MG: 50 TABLET, FILM COATED ORAL at 06:42

## 2021-03-08 RX ADMIN — HYDROXYZINE PAMOATE SCH MG: 25 CAPSULE ORAL at 18:05

## 2021-03-08 RX ADMIN — METOPROLOL TARTRATE SCH MG: 50 TABLET, FILM COATED ORAL at 18:05

## 2021-03-08 RX ADMIN — FLUOCINONIDE SCH APPLIC: 0.5 CREAM TOPICAL at 22:35

## 2021-03-08 RX ADMIN — HYDROXYZINE PAMOATE SCH MG: 25 CAPSULE ORAL at 06:42

## 2021-03-08 RX ADMIN — NIFEDIPINE SCH MG: 30 TABLET, EXTENDED RELEASE ORAL at 11:57

## 2021-03-09 RX ADMIN — HYDROCHLOROTHIAZIDE SCH MG: 25 TABLET ORAL at 06:14

## 2021-03-09 RX ADMIN — NICOTINE SCH: 21 PATCH TRANSDERMAL at 11:10

## 2021-03-09 RX ADMIN — NIFEDIPINE SCH MG: 30 TABLET, EXTENDED RELEASE ORAL at 11:10

## 2021-03-09 RX ADMIN — FLUOCINONIDE SCH APPLIC: 0.5 CREAM TOPICAL at 22:25

## 2021-03-09 RX ADMIN — HYDROXYZINE PAMOATE SCH MG: 25 CAPSULE ORAL at 11:10

## 2021-03-09 RX ADMIN — Medication SCH TAB: at 11:10

## 2021-03-09 RX ADMIN — METOPROLOL TARTRATE SCH MG: 50 TABLET, FILM COATED ORAL at 06:14

## 2021-03-09 RX ADMIN — PANTOPRAZOLE SODIUM SCH MG: 20 TABLET, DELAYED RELEASE ORAL at 11:10

## 2021-03-09 RX ADMIN — Medication SCH MG: at 22:25

## 2021-03-09 RX ADMIN — HYDROXYZINE PAMOATE SCH MG: 25 CAPSULE ORAL at 17:55

## 2021-03-09 RX ADMIN — FLUOCINONIDE SCH APPLIC: 0.5 CREAM TOPICAL at 11:10

## 2021-03-09 RX ADMIN — HYDROXYZINE PAMOATE SCH MG: 25 CAPSULE ORAL at 22:25

## 2021-03-09 RX ADMIN — HYDROXYZINE PAMOATE SCH: 25 CAPSULE ORAL at 13:21

## 2021-03-09 RX ADMIN — METOPROLOL TARTRATE SCH MG: 50 TABLET, FILM COATED ORAL at 17:55

## 2021-03-09 RX ADMIN — HYDROXYZINE PAMOATE SCH MG: 25 CAPSULE ORAL at 06:14

## 2021-03-10 ENCOUNTER — HOSPITAL ENCOUNTER (INPATIENT)
Dept: HOSPITAL 74 - YASAS | Age: 56
LOS: 14 days | Discharge: HOME | DRG: 772 | End: 2021-03-24
Attending: ALLERGY & IMMUNOLOGY | Admitting: ALLERGY & IMMUNOLOGY
Payer: COMMERCIAL

## 2021-03-10 VITALS — HEART RATE: 58 BPM | DIASTOLIC BLOOD PRESSURE: 74 MMHG | TEMPERATURE: 97.4 F | SYSTOLIC BLOOD PRESSURE: 132 MMHG

## 2021-03-10 DIAGNOSIS — F17.210: ICD-10-CM

## 2021-03-10 DIAGNOSIS — Z91.011: ICD-10-CM

## 2021-03-10 DIAGNOSIS — F20.9: ICD-10-CM

## 2021-03-10 DIAGNOSIS — M06.9: ICD-10-CM

## 2021-03-10 DIAGNOSIS — Z88.6: ICD-10-CM

## 2021-03-10 DIAGNOSIS — L30.9: ICD-10-CM

## 2021-03-10 DIAGNOSIS — F43.10: ICD-10-CM

## 2021-03-10 DIAGNOSIS — F14.20: ICD-10-CM

## 2021-03-10 DIAGNOSIS — Z62.810: ICD-10-CM

## 2021-03-10 DIAGNOSIS — L60.8: ICD-10-CM

## 2021-03-10 DIAGNOSIS — F19.282: ICD-10-CM

## 2021-03-10 DIAGNOSIS — E78.5: ICD-10-CM

## 2021-03-10 DIAGNOSIS — F19.24: ICD-10-CM

## 2021-03-10 DIAGNOSIS — F31.9: ICD-10-CM

## 2021-03-10 DIAGNOSIS — K21.9: ICD-10-CM

## 2021-03-10 DIAGNOSIS — I10: ICD-10-CM

## 2021-03-10 DIAGNOSIS — F39: ICD-10-CM

## 2021-03-10 DIAGNOSIS — F10.20: Primary | ICD-10-CM

## 2021-03-10 DIAGNOSIS — F12.20: ICD-10-CM

## 2021-03-10 PROCEDURE — HZ42ZZZ GROUP COUNSELING FOR SUBSTANCE ABUSE TREATMENT, COGNITIVE-BEHAVIORAL: ICD-10-PCS | Performed by: ALLERGY & IMMUNOLOGY

## 2021-03-10 PROCEDURE — U0003 INFECTIOUS AGENT DETECTION BY NUCLEIC ACID (DNA OR RNA); SEVERE ACUTE RESPIRATORY SYNDROME CORONAVIRUS 2 (SARS-COV-2) (CORONAVIRUS DISEASE [COVID-19]), AMPLIFIED PROBE TECHNIQUE, MAKING USE OF HIGH THROUGHPUT TECHNOLOGIES AS DESCRIBED BY CMS-2020-01-R: HCPCS

## 2021-03-10 PROCEDURE — C9803 HOPD COVID-19 SPEC COLLECT: HCPCS

## 2021-03-10 RX ADMIN — NAPROXEN PRN MG: 500 TABLET ORAL at 21:20

## 2021-03-10 RX ADMIN — HYDROXYZINE PAMOATE SCH MG: 25 CAPSULE ORAL at 06:38

## 2021-03-10 RX ADMIN — Medication SCH MG: at 21:18

## 2021-03-10 RX ADMIN — Medication SCH: at 16:50

## 2021-03-10 RX ADMIN — METOPROLOL TARTRATE SCH MG: 50 TABLET, FILM COATED ORAL at 06:38

## 2021-03-10 RX ADMIN — FLUOCINONIDE SCH: 0.5 CREAM TOPICAL at 21:21

## 2021-03-10 RX ADMIN — HYDROCHLOROTHIAZIDE SCH MG: 25 TABLET ORAL at 06:38

## 2021-03-10 RX ADMIN — METHOCARBAMOL SCH MG: 500 TABLET ORAL at 21:19

## 2021-03-10 RX ADMIN — METHOCARBAMOL SCH: 500 TABLET ORAL at 18:36

## 2021-03-10 RX ADMIN — METHOCARBAMOL SCH: 500 TABLET ORAL at 14:25

## 2021-03-11 RX ADMIN — Medication SCH: at 09:58

## 2021-03-11 RX ADMIN — PANTOPRAZOLE SODIUM SCH MG: 40 TABLET, DELAYED RELEASE ORAL at 09:58

## 2021-03-11 RX ADMIN — NICOTINE SCH: 21 PATCH TRANSDERMAL at 10:00

## 2021-03-11 RX ADMIN — HYDROCHLOROTHIAZIDE SCH MG: 25 TABLET ORAL at 09:58

## 2021-03-11 RX ADMIN — CITALOPRAM HYDROBROMIDE SCH MG: 10 TABLET ORAL at 09:58

## 2021-03-11 RX ADMIN — FLUOCINONIDE SCH: 0.5 CREAM TOPICAL at 21:24

## 2021-03-11 RX ADMIN — METHOCARBAMOL SCH MG: 500 TABLET ORAL at 09:58

## 2021-03-11 RX ADMIN — NIFEDIPINE SCH MG: 30 TABLET, EXTENDED RELEASE ORAL at 09:59

## 2021-03-11 RX ADMIN — METHOCARBAMOL SCH: 500 TABLET ORAL at 14:13

## 2021-03-11 RX ADMIN — FLUOCINONIDE SCH APPLIC: 0.5 CREAM TOPICAL at 10:00

## 2021-03-11 RX ADMIN — Medication SCH TAB: at 09:58

## 2021-03-11 RX ADMIN — METHOCARBAMOL SCH MG: 500 TABLET ORAL at 18:38

## 2021-03-11 RX ADMIN — METOPROLOL TARTRATE SCH MG: 50 TABLET, FILM COATED ORAL at 09:58

## 2021-03-11 RX ADMIN — Medication SCH MG: at 21:23

## 2021-03-11 RX ADMIN — Medication PRN APPLIC: at 09:56

## 2021-03-11 RX ADMIN — METHOCARBAMOL SCH MG: 500 TABLET ORAL at 21:23

## 2021-03-12 RX ADMIN — METHOCARBAMOL SCH MG: 500 TABLET ORAL at 10:09

## 2021-03-12 RX ADMIN — METHOCARBAMOL SCH MG: 500 TABLET ORAL at 21:22

## 2021-03-12 RX ADMIN — FLUOCINONIDE SCH: 0.5 CREAM TOPICAL at 10:11

## 2021-03-12 RX ADMIN — CITALOPRAM HYDROBROMIDE SCH MG: 10 TABLET ORAL at 10:10

## 2021-03-12 RX ADMIN — METHOCARBAMOL SCH: 500 TABLET ORAL at 17:31

## 2021-03-12 RX ADMIN — Medication SCH MG: at 21:22

## 2021-03-12 RX ADMIN — HYDROCHLOROTHIAZIDE SCH MG: 25 TABLET ORAL at 10:09

## 2021-03-12 RX ADMIN — Medication SCH: at 10:11

## 2021-03-12 RX ADMIN — HYDROXYZINE PAMOATE PRN MG: 25 CAPSULE ORAL at 21:22

## 2021-03-12 RX ADMIN — NAPROXEN PRN MG: 500 TABLET ORAL at 21:22

## 2021-03-12 RX ADMIN — METOPROLOL TARTRATE SCH MG: 50 TABLET, FILM COATED ORAL at 10:09

## 2021-03-12 RX ADMIN — PANTOPRAZOLE SODIUM SCH MG: 40 TABLET, DELAYED RELEASE ORAL at 10:09

## 2021-03-12 RX ADMIN — METHOCARBAMOL SCH MG: 500 TABLET ORAL at 17:31

## 2021-03-12 RX ADMIN — NICOTINE SCH: 21 PATCH TRANSDERMAL at 10:11

## 2021-03-12 RX ADMIN — Medication SCH TAB: at 10:10

## 2021-03-12 RX ADMIN — NIFEDIPINE SCH MG: 30 TABLET, EXTENDED RELEASE ORAL at 10:10

## 2021-03-12 RX ADMIN — FLUOCINONIDE SCH: 0.5 CREAM TOPICAL at 21:22

## 2021-03-13 RX ADMIN — PANTOPRAZOLE SODIUM SCH MG: 40 TABLET, DELAYED RELEASE ORAL at 10:31

## 2021-03-13 RX ADMIN — NICOTINE SCH: 21 PATCH TRANSDERMAL at 10:32

## 2021-03-13 RX ADMIN — HYDROCHLOROTHIAZIDE SCH MG: 25 TABLET ORAL at 10:31

## 2021-03-13 RX ADMIN — CITALOPRAM HYDROBROMIDE SCH MG: 10 TABLET ORAL at 10:31

## 2021-03-13 RX ADMIN — FLUOCINONIDE SCH: 0.5 CREAM TOPICAL at 10:31

## 2021-03-13 RX ADMIN — HYDROXYZINE PAMOATE PRN MG: 25 CAPSULE ORAL at 10:31

## 2021-03-13 RX ADMIN — Medication SCH MG: at 21:59

## 2021-03-13 RX ADMIN — Medication SCH TAB: at 10:31

## 2021-03-13 RX ADMIN — Medication SCH: at 10:31

## 2021-03-13 RX ADMIN — FLUOCINONIDE SCH: 0.5 CREAM TOPICAL at 21:59

## 2021-03-13 RX ADMIN — METHOCARBAMOL SCH MG: 500 TABLET ORAL at 21:59

## 2021-03-13 RX ADMIN — NIFEDIPINE SCH MG: 30 TABLET, EXTENDED RELEASE ORAL at 10:31

## 2021-03-13 RX ADMIN — METHOCARBAMOL SCH MG: 500 TABLET ORAL at 17:36

## 2021-03-13 RX ADMIN — METHOCARBAMOL SCH MG: 500 TABLET ORAL at 10:31

## 2021-03-13 RX ADMIN — METHOCARBAMOL SCH MG: 500 TABLET ORAL at 13:49

## 2021-03-13 RX ADMIN — METOPROLOL TARTRATE SCH MG: 50 TABLET, FILM COATED ORAL at 10:31

## 2021-03-14 RX ADMIN — METHOCARBAMOL SCH MG: 500 TABLET ORAL at 21:22

## 2021-03-14 RX ADMIN — NAPROXEN PRN MG: 500 TABLET ORAL at 21:23

## 2021-03-14 RX ADMIN — NIFEDIPINE SCH MG: 30 TABLET, EXTENDED RELEASE ORAL at 10:39

## 2021-03-14 RX ADMIN — NICOTINE SCH: 21 PATCH TRANSDERMAL at 10:39

## 2021-03-14 RX ADMIN — CITALOPRAM HYDROBROMIDE SCH MG: 10 TABLET ORAL at 10:39

## 2021-03-14 RX ADMIN — Medication SCH TAB: at 10:38

## 2021-03-14 RX ADMIN — Medication SCH MG: at 21:22

## 2021-03-14 RX ADMIN — METHOCARBAMOL SCH MG: 500 TABLET ORAL at 14:10

## 2021-03-14 RX ADMIN — METHOCARBAMOL SCH MG: 500 TABLET ORAL at 10:39

## 2021-03-14 RX ADMIN — FLUOCINONIDE SCH: 0.5 CREAM TOPICAL at 10:40

## 2021-03-14 RX ADMIN — METOPROLOL TARTRATE SCH MG: 50 TABLET, FILM COATED ORAL at 10:38

## 2021-03-14 RX ADMIN — FLUOCINONIDE SCH: 0.5 CREAM TOPICAL at 22:18

## 2021-03-14 RX ADMIN — Medication SCH: at 10:40

## 2021-03-14 RX ADMIN — HYDROCHLOROTHIAZIDE SCH MG: 25 TABLET ORAL at 10:39

## 2021-03-14 RX ADMIN — METHOCARBAMOL SCH: 500 TABLET ORAL at 19:08

## 2021-03-14 RX ADMIN — PANTOPRAZOLE SODIUM SCH MG: 40 TABLET, DELAYED RELEASE ORAL at 10:39

## 2021-03-15 RX ADMIN — Medication SCH TAB: at 11:05

## 2021-03-15 RX ADMIN — METHOCARBAMOL SCH MG: 500 TABLET ORAL at 21:44

## 2021-03-15 RX ADMIN — METOPROLOL TARTRATE SCH MG: 50 TABLET, FILM COATED ORAL at 11:04

## 2021-03-15 RX ADMIN — NICOTINE SCH: 21 PATCH TRANSDERMAL at 11:05

## 2021-03-15 RX ADMIN — PANTOPRAZOLE SODIUM SCH MG: 40 TABLET, DELAYED RELEASE ORAL at 11:03

## 2021-03-15 RX ADMIN — FLUOCINONIDE SCH APPLIC: 0.5 CREAM TOPICAL at 11:04

## 2021-03-15 RX ADMIN — NAPROXEN PRN MG: 500 TABLET ORAL at 21:44

## 2021-03-15 RX ADMIN — METHOCARBAMOL SCH MG: 500 TABLET ORAL at 17:38

## 2021-03-15 RX ADMIN — CITALOPRAM HYDROBROMIDE SCH MG: 10 TABLET ORAL at 11:04

## 2021-03-15 RX ADMIN — HYDROCHLOROTHIAZIDE SCH MG: 25 TABLET ORAL at 11:03

## 2021-03-15 RX ADMIN — Medication SCH MG: at 21:44

## 2021-03-15 RX ADMIN — METHOCARBAMOL SCH MG: 500 TABLET ORAL at 11:05

## 2021-03-15 RX ADMIN — NIFEDIPINE SCH MG: 30 TABLET, EXTENDED RELEASE ORAL at 11:03

## 2021-03-15 RX ADMIN — Medication SCH: at 11:05

## 2021-03-15 RX ADMIN — HYDROXYZINE PAMOATE PRN MG: 25 CAPSULE ORAL at 21:44

## 2021-03-15 RX ADMIN — FLUOCINONIDE SCH: 0.5 CREAM TOPICAL at 23:06

## 2021-03-15 RX ADMIN — METHOCARBAMOL SCH MG: 500 TABLET ORAL at 14:36

## 2021-03-16 RX ADMIN — CITALOPRAM HYDROBROMIDE SCH MG: 10 TABLET ORAL at 10:37

## 2021-03-16 RX ADMIN — NIFEDIPINE SCH MG: 30 TABLET, EXTENDED RELEASE ORAL at 10:38

## 2021-03-16 RX ADMIN — HYDROXYZINE PAMOATE PRN MG: 25 CAPSULE ORAL at 21:35

## 2021-03-16 RX ADMIN — HYDROXYZINE PAMOATE PRN MG: 25 CAPSULE ORAL at 10:36

## 2021-03-16 RX ADMIN — NAPROXEN PRN MG: 500 TABLET ORAL at 21:35

## 2021-03-16 RX ADMIN — METHOCARBAMOL SCH MG: 500 TABLET ORAL at 21:35

## 2021-03-16 RX ADMIN — Medication SCH TAB: at 10:35

## 2021-03-16 RX ADMIN — PANTOPRAZOLE SODIUM SCH MG: 40 TABLET, DELAYED RELEASE ORAL at 10:36

## 2021-03-16 RX ADMIN — METOPROLOL TARTRATE SCH MG: 50 TABLET, FILM COATED ORAL at 10:37

## 2021-03-16 RX ADMIN — METHOCARBAMOL SCH: 500 TABLET ORAL at 14:39

## 2021-03-16 RX ADMIN — FLUOCINONIDE SCH APPLIC: 0.5 CREAM TOPICAL at 21:38

## 2021-03-16 RX ADMIN — Medication SCH MG: at 21:35

## 2021-03-16 RX ADMIN — HYDROCHLOROTHIAZIDE SCH MG: 25 TABLET ORAL at 10:36

## 2021-03-16 RX ADMIN — METHOCARBAMOL SCH MG: 500 TABLET ORAL at 10:36

## 2021-03-16 RX ADMIN — NAPROXEN PRN MG: 500 TABLET ORAL at 10:36

## 2021-03-16 RX ADMIN — NICOTINE SCH: 21 PATCH TRANSDERMAL at 10:35

## 2021-03-16 RX ADMIN — FLUOCINONIDE SCH: 0.5 CREAM TOPICAL at 10:37

## 2021-03-16 RX ADMIN — Medication SCH: at 10:35

## 2021-03-16 RX ADMIN — METHOCARBAMOL SCH MG: 500 TABLET ORAL at 17:42

## 2021-03-17 RX ADMIN — Medication SCH MG: at 21:18

## 2021-03-17 RX ADMIN — Medication SCH: at 10:35

## 2021-03-17 RX ADMIN — METHOCARBAMOL SCH MG: 500 TABLET ORAL at 10:31

## 2021-03-17 RX ADMIN — Medication SCH: at 22:05

## 2021-03-17 RX ADMIN — NICOTINE SCH: 21 PATCH TRANSDERMAL at 10:35

## 2021-03-17 RX ADMIN — FLUOCINONIDE SCH: 0.5 CREAM TOPICAL at 22:05

## 2021-03-17 RX ADMIN — NAPROXEN PRN MG: 500 TABLET ORAL at 10:33

## 2021-03-17 RX ADMIN — METOPROLOL TARTRATE SCH MG: 50 TABLET, FILM COATED ORAL at 13:13

## 2021-03-17 RX ADMIN — METHOCARBAMOL SCH MG: 500 TABLET ORAL at 21:17

## 2021-03-17 RX ADMIN — NAPROXEN PRN MG: 500 TABLET ORAL at 21:17

## 2021-03-17 RX ADMIN — METHOCARBAMOL SCH: 500 TABLET ORAL at 13:15

## 2021-03-17 RX ADMIN — PANTOPRAZOLE SODIUM SCH MG: 40 TABLET, DELAYED RELEASE ORAL at 10:31

## 2021-03-17 RX ADMIN — HYDROCHLOROTHIAZIDE SCH MG: 25 TABLET ORAL at 10:31

## 2021-03-17 RX ADMIN — METHOCARBAMOL SCH MG: 500 TABLET ORAL at 18:06

## 2021-03-17 RX ADMIN — Medication SCH TAB: at 10:31

## 2021-03-17 RX ADMIN — NIFEDIPINE SCH MG: 30 TABLET, EXTENDED RELEASE ORAL at 10:32

## 2021-03-17 RX ADMIN — CITALOPRAM HYDROBROMIDE SCH MG: 10 TABLET ORAL at 10:32

## 2021-03-17 RX ADMIN — FLUOCINONIDE SCH: 0.5 CREAM TOPICAL at 10:35

## 2021-03-18 RX ADMIN — CITALOPRAM HYDROBROMIDE SCH MG: 10 TABLET ORAL at 10:25

## 2021-03-18 RX ADMIN — METHOCARBAMOL SCH: 500 TABLET ORAL at 14:18

## 2021-03-18 RX ADMIN — HYDROCHLOROTHIAZIDE SCH MG: 25 TABLET ORAL at 10:24

## 2021-03-18 RX ADMIN — Medication SCH: at 21:01

## 2021-03-18 RX ADMIN — METHOCARBAMOL SCH MG: 500 TABLET ORAL at 10:24

## 2021-03-18 RX ADMIN — Medication SCH APPLIC: at 10:27

## 2021-03-18 RX ADMIN — NIFEDIPINE SCH MG: 30 TABLET, EXTENDED RELEASE ORAL at 10:25

## 2021-03-18 RX ADMIN — NAPROXEN PRN MG: 500 TABLET ORAL at 21:00

## 2021-03-18 RX ADMIN — Medication SCH MG: at 21:00

## 2021-03-18 RX ADMIN — Medication SCH TAB: at 10:24

## 2021-03-18 RX ADMIN — NAPROXEN PRN MG: 500 TABLET ORAL at 10:28

## 2021-03-18 RX ADMIN — METHOCARBAMOL SCH: 500 TABLET ORAL at 19:07

## 2021-03-18 RX ADMIN — FLUOCINONIDE SCH APPLIC: 0.5 CREAM TOPICAL at 10:27

## 2021-03-18 RX ADMIN — NICOTINE SCH: 21 PATCH TRANSDERMAL at 10:25

## 2021-03-18 RX ADMIN — FLUOCINONIDE SCH: 0.5 CREAM TOPICAL at 23:53

## 2021-03-18 RX ADMIN — PANTOPRAZOLE SODIUM SCH MG: 40 TABLET, DELAYED RELEASE ORAL at 10:24

## 2021-03-18 RX ADMIN — METHOCARBAMOL SCH MG: 500 TABLET ORAL at 21:00

## 2021-03-18 RX ADMIN — METOPROLOL TARTRATE SCH MG: 50 TABLET, FILM COATED ORAL at 10:24

## 2021-03-18 RX ADMIN — Medication SCH MG: at 20:57

## 2021-03-19 RX ADMIN — METHOCARBAMOL SCH MG: 500 TABLET ORAL at 21:06

## 2021-03-19 RX ADMIN — METHOCARBAMOL SCH MG: 500 TABLET ORAL at 10:21

## 2021-03-19 RX ADMIN — HYDROCHLOROTHIAZIDE SCH MG: 25 TABLET ORAL at 10:21

## 2021-03-19 RX ADMIN — METHOCARBAMOL SCH: 500 TABLET ORAL at 13:43

## 2021-03-19 RX ADMIN — Medication SCH MG: at 21:06

## 2021-03-19 RX ADMIN — Medication SCH APPLIC: at 10:21

## 2021-03-19 RX ADMIN — METHOCARBAMOL SCH: 500 TABLET ORAL at 19:11

## 2021-03-19 RX ADMIN — METOPROLOL TARTRATE SCH MG: 50 TABLET, FILM COATED ORAL at 10:22

## 2021-03-19 RX ADMIN — FLUOCINONIDE SCH: 0.5 CREAM TOPICAL at 10:23

## 2021-03-19 RX ADMIN — CITALOPRAM HYDROBROMIDE SCH MG: 10 TABLET ORAL at 10:23

## 2021-03-19 RX ADMIN — NIFEDIPINE SCH MG: 30 TABLET, EXTENDED RELEASE ORAL at 10:23

## 2021-03-19 RX ADMIN — NAPROXEN PRN MG: 500 TABLET ORAL at 21:06

## 2021-03-19 RX ADMIN — NICOTINE SCH: 21 PATCH TRANSDERMAL at 10:20

## 2021-03-19 RX ADMIN — PANTOPRAZOLE SODIUM SCH MG: 40 TABLET, DELAYED RELEASE ORAL at 10:21

## 2021-03-19 RX ADMIN — Medication SCH TAB: at 10:20

## 2021-03-19 RX ADMIN — Medication PRN APPLIC: at 21:05

## 2021-03-19 RX ADMIN — FLUOCINONIDE SCH: 0.5 CREAM TOPICAL at 21:07

## 2021-03-20 RX ADMIN — HYDROCHLOROTHIAZIDE SCH MG: 25 TABLET ORAL at 10:21

## 2021-03-20 RX ADMIN — NIFEDIPINE SCH MG: 30 TABLET, EXTENDED RELEASE ORAL at 10:23

## 2021-03-20 RX ADMIN — PANTOPRAZOLE SODIUM SCH MG: 40 TABLET, DELAYED RELEASE ORAL at 10:21

## 2021-03-20 RX ADMIN — Medication SCH TAB: at 10:22

## 2021-03-20 RX ADMIN — HYDROXYZINE PAMOATE PRN MG: 25 CAPSULE ORAL at 21:27

## 2021-03-20 RX ADMIN — Medication SCH MG: at 21:27

## 2021-03-20 RX ADMIN — FLUOCINONIDE SCH: 0.5 CREAM TOPICAL at 21:29

## 2021-03-20 RX ADMIN — NICOTINE SCH: 21 PATCH TRANSDERMAL at 10:22

## 2021-03-20 RX ADMIN — METHOCARBAMOL SCH MG: 500 TABLET ORAL at 21:27

## 2021-03-20 RX ADMIN — METOPROLOL TARTRATE SCH MG: 50 TABLET, FILM COATED ORAL at 10:21

## 2021-03-20 RX ADMIN — METHOCARBAMOL SCH: 500 TABLET ORAL at 13:49

## 2021-03-20 RX ADMIN — METHOCARBAMOL SCH MG: 500 TABLET ORAL at 17:44

## 2021-03-20 RX ADMIN — Medication SCH: at 10:46

## 2021-03-20 RX ADMIN — NAPROXEN PRN MG: 500 TABLET ORAL at 21:27

## 2021-03-20 RX ADMIN — FLUOCINONIDE SCH: 0.5 CREAM TOPICAL at 10:24

## 2021-03-20 RX ADMIN — METHOCARBAMOL SCH MG: 500 TABLET ORAL at 10:21

## 2021-03-20 RX ADMIN — CITALOPRAM HYDROBROMIDE SCH MG: 10 TABLET ORAL at 10:21

## 2021-03-21 RX ADMIN — FLUOCINONIDE SCH: 0.5 CREAM TOPICAL at 21:46

## 2021-03-21 RX ADMIN — HYDROCHLOROTHIAZIDE SCH MG: 25 TABLET ORAL at 10:27

## 2021-03-21 RX ADMIN — NAPROXEN PRN MG: 500 TABLET ORAL at 21:45

## 2021-03-21 RX ADMIN — NICOTINE SCH: 21 PATCH TRANSDERMAL at 10:28

## 2021-03-21 RX ADMIN — METOPROLOL TARTRATE SCH: 50 TABLET, FILM COATED ORAL at 10:29

## 2021-03-21 RX ADMIN — METHOCARBAMOL SCH MG: 500 TABLET ORAL at 10:27

## 2021-03-21 RX ADMIN — FLUOCINONIDE SCH: 0.5 CREAM TOPICAL at 10:30

## 2021-03-21 RX ADMIN — NIFEDIPINE SCH MG: 30 TABLET, EXTENDED RELEASE ORAL at 10:28

## 2021-03-21 RX ADMIN — PANTOPRAZOLE SODIUM SCH MG: 40 TABLET, DELAYED RELEASE ORAL at 10:28

## 2021-03-21 RX ADMIN — Medication SCH MG: at 21:45

## 2021-03-21 RX ADMIN — METHOCARBAMOL SCH: 500 TABLET ORAL at 13:30

## 2021-03-21 RX ADMIN — CITALOPRAM HYDROBROMIDE SCH MG: 10 TABLET ORAL at 10:29

## 2021-03-21 RX ADMIN — METHOCARBAMOL SCH MG: 500 TABLET ORAL at 21:45

## 2021-03-21 RX ADMIN — Medication SCH TAB: at 10:28

## 2021-03-21 RX ADMIN — HYDROXYZINE PAMOATE PRN MG: 25 CAPSULE ORAL at 21:45

## 2021-03-21 RX ADMIN — METHOCARBAMOL SCH MG: 500 TABLET ORAL at 17:37

## 2021-03-21 RX ADMIN — Medication SCH: at 10:29

## 2021-03-22 RX ADMIN — METOPROLOL TARTRATE SCH MG: 50 TABLET, FILM COATED ORAL at 10:34

## 2021-03-22 RX ADMIN — Medication PRN APPLIC: at 13:02

## 2021-03-22 RX ADMIN — NAPROXEN PRN MG: 500 TABLET ORAL at 21:23

## 2021-03-22 RX ADMIN — CITALOPRAM HYDROBROMIDE SCH MG: 10 TABLET ORAL at 10:33

## 2021-03-22 RX ADMIN — METHOCARBAMOL SCH MG: 500 TABLET ORAL at 10:32

## 2021-03-22 RX ADMIN — METHOCARBAMOL SCH MG: 500 TABLET ORAL at 21:23

## 2021-03-22 RX ADMIN — METHOCARBAMOL SCH: 500 TABLET ORAL at 13:03

## 2021-03-22 RX ADMIN — NICOTINE SCH: 21 PATCH TRANSDERMAL at 10:32

## 2021-03-22 RX ADMIN — NIFEDIPINE SCH MG: 30 TABLET, EXTENDED RELEASE ORAL at 10:32

## 2021-03-22 RX ADMIN — Medication SCH MG: at 21:23

## 2021-03-22 RX ADMIN — PANTOPRAZOLE SODIUM SCH MG: 40 TABLET, DELAYED RELEASE ORAL at 10:32

## 2021-03-22 RX ADMIN — Medication SCH: at 10:34

## 2021-03-22 RX ADMIN — HYDROCHLOROTHIAZIDE SCH MG: 25 TABLET ORAL at 10:32

## 2021-03-22 RX ADMIN — FLUOCINONIDE SCH: 0.5 CREAM TOPICAL at 10:33

## 2021-03-22 RX ADMIN — METHOCARBAMOL SCH: 500 TABLET ORAL at 19:28

## 2021-03-22 RX ADMIN — FLUOCINONIDE SCH: 0.5 CREAM TOPICAL at 21:32

## 2021-03-22 RX ADMIN — Medication SCH MG: at 21:22

## 2021-03-22 RX ADMIN — Medication SCH TAB: at 10:32

## 2021-03-23 RX ADMIN — NIFEDIPINE SCH MG: 30 TABLET, EXTENDED RELEASE ORAL at 10:35

## 2021-03-23 RX ADMIN — NICOTINE SCH: 21 PATCH TRANSDERMAL at 10:35

## 2021-03-23 RX ADMIN — METOPROLOL TARTRATE SCH MG: 50 TABLET, FILM COATED ORAL at 10:34

## 2021-03-23 RX ADMIN — PANTOPRAZOLE SODIUM SCH MG: 40 TABLET, DELAYED RELEASE ORAL at 10:32

## 2021-03-23 RX ADMIN — Medication SCH MG: at 21:49

## 2021-03-23 RX ADMIN — METHOCARBAMOL SCH MG: 500 TABLET ORAL at 13:05

## 2021-03-23 RX ADMIN — FLUOCINONIDE SCH: 0.5 CREAM TOPICAL at 21:49

## 2021-03-23 RX ADMIN — HYDROCHLOROTHIAZIDE SCH MG: 25 TABLET ORAL at 10:32

## 2021-03-23 RX ADMIN — CITALOPRAM HYDROBROMIDE SCH MG: 10 TABLET ORAL at 10:34

## 2021-03-23 RX ADMIN — METHOCARBAMOL SCH MG: 500 TABLET ORAL at 21:49

## 2021-03-23 RX ADMIN — Medication SCH TAB: at 10:32

## 2021-03-23 RX ADMIN — METHOCARBAMOL SCH MG: 500 TABLET ORAL at 10:32

## 2021-03-23 RX ADMIN — Medication SCH: at 10:35

## 2021-03-23 RX ADMIN — Medication SCH MG: at 21:48

## 2021-03-23 RX ADMIN — FLUOCINONIDE SCH: 0.5 CREAM TOPICAL at 10:36

## 2021-03-23 RX ADMIN — METHOCARBAMOL SCH MG: 500 TABLET ORAL at 17:31

## 2021-03-24 VITALS — DIASTOLIC BLOOD PRESSURE: 75 MMHG | HEART RATE: 67 BPM | SYSTOLIC BLOOD PRESSURE: 122 MMHG

## 2021-03-24 VITALS — TEMPERATURE: 97.6 F

## 2021-03-24 RX ADMIN — NIFEDIPINE SCH MG: 30 TABLET, EXTENDED RELEASE ORAL at 10:13

## 2021-03-24 RX ADMIN — CITALOPRAM HYDROBROMIDE SCH MG: 10 TABLET ORAL at 10:13

## 2021-03-24 RX ADMIN — Medication SCH TAB: at 10:11

## 2021-03-24 RX ADMIN — HYDROCHLOROTHIAZIDE SCH MG: 25 TABLET ORAL at 10:11

## 2021-03-24 RX ADMIN — FLUOCINONIDE SCH: 0.5 CREAM TOPICAL at 10:12

## 2021-03-24 RX ADMIN — METHOCARBAMOL SCH MG: 500 TABLET ORAL at 10:12

## 2021-03-24 RX ADMIN — METOPROLOL TARTRATE SCH MG: 50 TABLET, FILM COATED ORAL at 10:13

## 2021-03-24 RX ADMIN — PANTOPRAZOLE SODIUM SCH MG: 40 TABLET, DELAYED RELEASE ORAL at 10:12

## 2021-03-24 RX ADMIN — NICOTINE SCH: 21 PATCH TRANSDERMAL at 10:12

## 2021-03-24 RX ADMIN — Medication SCH: at 10:12

## 2021-03-28 ENCOUNTER — EMERGENCY (EMERGENCY)
Facility: HOSPITAL | Age: 56
LOS: 1 days | Discharge: HOME | End: 2021-03-28
Attending: EMERGENCY MEDICINE | Admitting: EMERGENCY MEDICINE
Payer: MEDICAID

## 2021-03-28 VITALS
TEMPERATURE: 98 F | DIASTOLIC BLOOD PRESSURE: 86 MMHG | RESPIRATION RATE: 18 BRPM | SYSTOLIC BLOOD PRESSURE: 105 MMHG | OXYGEN SATURATION: 96 % | HEART RATE: 85 BPM

## 2021-03-28 DIAGNOSIS — I10 ESSENTIAL (PRIMARY) HYPERTENSION: ICD-10-CM

## 2021-03-28 DIAGNOSIS — B85.1 PEDICULOSIS DUE TO PEDICULUS HUMANUS CORPORIS: ICD-10-CM

## 2021-03-28 DIAGNOSIS — Z88.6 ALLERGY STATUS TO ANALGESIC AGENT: ICD-10-CM

## 2021-03-28 DIAGNOSIS — E78.5 HYPERLIPIDEMIA, UNSPECIFIED: ICD-10-CM

## 2021-03-28 DIAGNOSIS — Z76.0 ENCOUNTER FOR ISSUE OF REPEAT PRESCRIPTION: ICD-10-CM

## 2021-03-28 PROCEDURE — 99283 EMERGENCY DEPT VISIT LOW MDM: CPT

## 2021-03-28 RX ORDER — PERMETHRIN CREAM 5% W/W 50 MG/G
1 CREAM TOPICAL ONCE
Refills: 0 | Status: COMPLETED | OUTPATIENT
Start: 2021-03-28 | End: 2021-03-28

## 2021-03-29 PROBLEM — E78.5 HYPERLIPIDEMIA, UNSPECIFIED: Chronic | Status: ACTIVE | Noted: 2019-07-15

## 2021-03-29 PROBLEM — I10 ESSENTIAL (PRIMARY) HYPERTENSION: Chronic | Status: ACTIVE | Noted: 2019-07-15

## 2021-03-29 RX ADMIN — PERMETHRIN CREAM 5% W/W 1 APPLICATION(S): 50 CREAM TOPICAL at 00:23

## 2021-03-29 NOTE — ED ADULT NURSE REASSESSMENT NOTE - NS ED NURSE REASSESS COMMENT FT1
pt refusing to get off the EMS stretcher at time of discharge. Making multiple demands. Given cream, new clothing and metro card. Escorted out to the lobby and given opportunity to change in the bathroom.

## 2021-03-29 NOTE — ED PROVIDER NOTE - PHYSICAL EXAMINATION
CONSTITUTIONAL: Well appearing, well nourished, awake, alert, oriented to person, place, time/situation and in no apparent distress.  ENT: Airway patent, Nasal mucosa clear. Mouth with normal mucosa.  EYES: Clear bilaterally.  RESPIRATORY: Breathing comfortably with normal RR.  MSK: Range of motion is not limited, no deformities noted.  NEURO: Alert and oriented, no focal deficits.  SKIN: Skin normal color for race, warm, dry and intact. No evidence of rash. +Body lice.   PSYCH: Alert and oriented to person, place, time/situation. normal mood and affect. no apparent risk to self or others.

## 2021-03-29 NOTE — ED PROVIDER NOTE - NSFOLLOWUPINSTRUCTIONS_ED_ALL_ED_FT
-PLEASE FOLLOW-UP WITH YOUR PRIMARY CARE DOCTOR IN 1-2 DAYS.  BRING ALL PAPERWORK FROM TODAY'S VISIT TO YOUR FOLLOW-UP VISIT.     -PLEASE COVER ENTIRE BODY WITH PERMETHRIN CREAM FOR 8 HOURS THEN WASH OFF COMPLETELY.      -PLEASE RETURN TO THE ER IMMEDIATELY OR CALL 911 FOR ANY HIGH FEVER, TROUBLE BREATHING, VOMITING, SEVERE PAIN, OR ANY OTHER CONCERNS.

## 2021-03-29 NOTE — ED PROVIDER NOTE - OBJECTIVE STATEMENT
Pt is a 55yo F who presents reporting body lice.  Requesting treatment.  Denies any other complaints.  Denies any skin erythema or edema.  Denies F/C.

## 2021-03-29 NOTE — ED PROVIDER NOTE - PATIENT PORTAL LINK FT
You can access the FollowMyHealth Patient Portal offered by [facility] by registering at the following website: [email or web address]. By joining [email or web address]’s FollowMyHealth portal, you will also be able to view your health information using other applications (apps) compatible with our system.

## 2021-05-19 ENCOUNTER — HOSPITAL ENCOUNTER (INPATIENT)
Dept: HOSPITAL 74 - YASAS | Age: 56
LOS: 3 days | Discharge: LEFT BEFORE DISCHARGE | DRG: 770 | End: 2021-05-22
Attending: ALLERGY & IMMUNOLOGY | Admitting: ALLERGY & IMMUNOLOGY
Payer: COMMERCIAL

## 2021-05-19 VITALS — BODY MASS INDEX: 19.5 KG/M2

## 2021-05-19 DIAGNOSIS — F43.10: ICD-10-CM

## 2021-05-19 DIAGNOSIS — F17.210: ICD-10-CM

## 2021-05-19 DIAGNOSIS — F12.20: ICD-10-CM

## 2021-05-19 DIAGNOSIS — Z91.011: ICD-10-CM

## 2021-05-19 DIAGNOSIS — F10.24: ICD-10-CM

## 2021-05-19 DIAGNOSIS — L30.9: ICD-10-CM

## 2021-05-19 DIAGNOSIS — E78.5: ICD-10-CM

## 2021-05-19 DIAGNOSIS — F25.9: ICD-10-CM

## 2021-05-19 DIAGNOSIS — F31.9: ICD-10-CM

## 2021-05-19 DIAGNOSIS — K21.9: ICD-10-CM

## 2021-05-19 DIAGNOSIS — I10: ICD-10-CM

## 2021-05-19 DIAGNOSIS — Z62.810: ICD-10-CM

## 2021-05-19 DIAGNOSIS — F10.230: Primary | ICD-10-CM

## 2021-05-19 DIAGNOSIS — M06.9: ICD-10-CM

## 2021-05-19 DIAGNOSIS — F14.20: ICD-10-CM

## 2021-05-19 DIAGNOSIS — Z91.14: ICD-10-CM

## 2021-05-19 DIAGNOSIS — F19.24: ICD-10-CM

## 2021-05-19 DIAGNOSIS — E86.0: ICD-10-CM

## 2021-05-19 DIAGNOSIS — F19.282: ICD-10-CM

## 2021-05-19 DIAGNOSIS — Z88.8: ICD-10-CM

## 2021-05-19 DIAGNOSIS — B35.1: ICD-10-CM

## 2021-05-19 PROCEDURE — HZ2ZZZZ DETOXIFICATION SERVICES FOR SUBSTANCE ABUSE TREATMENT: ICD-10-PCS | Performed by: ALLERGY & IMMUNOLOGY

## 2021-05-19 PROCEDURE — C9803 HOPD COVID-19 SPEC COLLECT: HCPCS

## 2021-05-19 PROCEDURE — U0005 INFEC AGEN DETEC AMPLI PROBE: HCPCS

## 2021-05-19 PROCEDURE — U0003 INFECTIOUS AGENT DETECTION BY NUCLEIC ACID (DNA OR RNA); SEVERE ACUTE RESPIRATORY SYNDROME CORONAVIRUS 2 (SARS-COV-2) (CORONAVIRUS DISEASE [COVID-19]), AMPLIFIED PROBE TECHNIQUE, MAKING USE OF HIGH THROUGHPUT TECHNOLOGIES AS DESCRIBED BY CMS-2020-01-R: HCPCS

## 2021-05-19 RX ADMIN — Medication SCH MG: at 23:05

## 2021-05-20 LAB
ALBUMIN SERPL-MCNC: 3 G/DL (ref 3.4–5)
ALP SERPL-CCNC: 58 U/L (ref 45–117)
ALT SERPL-CCNC: 19 U/L (ref 13–61)
ANION GAP SERPL CALC-SCNC: 5 MMOL/L (ref 8–16)
AST SERPL-CCNC: 15 U/L (ref 15–37)
BILIRUB SERPL-MCNC: 0.4 MG/DL (ref 0.2–1)
BUN SERPL-MCNC: 9.9 MG/DL (ref 7–18)
CALCIUM SERPL-MCNC: 8.5 MG/DL (ref 8.5–10.1)
CHLORIDE SERPL-SCNC: 109 MMOL/L (ref 98–107)
CO2 SERPL-SCNC: 29 MMOL/L (ref 21–32)
CREAT SERPL-MCNC: 0.8 MG/DL (ref 0.55–1.3)
DEPRECATED RDW RBC AUTO: 14.4 % (ref 11.6–15.6)
GLUCOSE SERPL-MCNC: 86 MG/DL (ref 74–106)
HCT VFR BLD CALC: 40.9 % (ref 32.4–45.2)
HGB BLD-MCNC: 13.6 GM/DL (ref 10.7–15.3)
MCH RBC QN AUTO: 31.7 PG (ref 25.7–33.7)
MCHC RBC AUTO-ENTMCNC: 33.3 G/DL (ref 32–36)
MCV RBC: 95.3 FL (ref 80–96)
PLATELET # BLD AUTO: 295 K/MM3 (ref 134–434)
PMV BLD: 9.7 FL (ref 7.5–11.1)
PROT SERPL-MCNC: 6.1 G/DL (ref 6.4–8.2)
RBC # BLD AUTO: 4.29 M/MM3 (ref 3.6–5.2)
SODIUM SERPL-SCNC: 143 MMOL/L (ref 136–145)
WBC # BLD AUTO: 6.6 K/MM3 (ref 4–10)

## 2021-05-20 RX ADMIN — Medication SCH MG: at 22:36

## 2021-05-20 RX ADMIN — NICOTINE SCH MG: 21 PATCH TRANSDERMAL at 11:35

## 2021-05-20 RX ADMIN — CITALOPRAM HYDROBROMIDE SCH MG: 10 TABLET ORAL at 13:52

## 2021-05-20 RX ADMIN — NIFEDIPINE SCH MG: 30 TABLET, EXTENDED RELEASE ORAL at 11:35

## 2021-05-20 RX ADMIN — METOPROLOL TARTRATE SCH MG: 50 TABLET, FILM COATED ORAL at 11:36

## 2021-05-20 RX ADMIN — PANTOPRAZOLE SODIUM SCH MG: 20 TABLET, DELAYED RELEASE ORAL at 11:36

## 2021-05-20 RX ADMIN — Medication SCH TAB: at 11:36

## 2021-05-20 RX ADMIN — METOPROLOL TARTRATE SCH MG: 50 TABLET, FILM COATED ORAL at 22:36

## 2021-05-20 RX ADMIN — HYDROCHLOROTHIAZIDE SCH MG: 25 TABLET ORAL at 11:35

## 2021-05-21 RX ADMIN — METOPROLOL TARTRATE SCH MG: 50 TABLET, FILM COATED ORAL at 11:13

## 2021-05-21 RX ADMIN — PANTOPRAZOLE SODIUM SCH MG: 20 TABLET, DELAYED RELEASE ORAL at 11:13

## 2021-05-21 RX ADMIN — NIFEDIPINE SCH MG: 30 TABLET, EXTENDED RELEASE ORAL at 11:13

## 2021-05-21 RX ADMIN — CITALOPRAM HYDROBROMIDE SCH MG: 10 TABLET ORAL at 11:13

## 2021-05-21 RX ADMIN — METOPROLOL TARTRATE SCH MG: 50 TABLET, FILM COATED ORAL at 22:31

## 2021-05-21 RX ADMIN — HYDROCHLOROTHIAZIDE SCH MG: 25 TABLET ORAL at 11:13

## 2021-05-21 RX ADMIN — Medication SCH TAB: at 11:14

## 2021-05-21 RX ADMIN — NICOTINE SCH: 21 PATCH TRANSDERMAL at 11:13

## 2021-05-21 RX ADMIN — Medication SCH MG: at 22:31

## 2021-05-22 VITALS — SYSTOLIC BLOOD PRESSURE: 141 MMHG | TEMPERATURE: 98.3 F | DIASTOLIC BLOOD PRESSURE: 90 MMHG

## 2021-05-22 VITALS — HEART RATE: 62 BPM

## 2021-05-22 RX ADMIN — CITALOPRAM HYDROBROMIDE SCH MG: 10 TABLET ORAL at 11:13

## 2021-05-22 RX ADMIN — PANTOPRAZOLE SODIUM SCH MG: 20 TABLET, DELAYED RELEASE ORAL at 11:12

## 2021-05-22 RX ADMIN — HYDROCHLOROTHIAZIDE SCH MG: 25 TABLET ORAL at 11:12

## 2021-05-22 RX ADMIN — Medication SCH TAB: at 11:11

## 2021-05-22 RX ADMIN — NICOTINE SCH: 21 PATCH TRANSDERMAL at 11:15

## 2021-05-22 RX ADMIN — METOPROLOL TARTRATE SCH MG: 50 TABLET, FILM COATED ORAL at 11:14

## 2021-05-22 RX ADMIN — NIFEDIPINE SCH MG: 30 TABLET, EXTENDED RELEASE ORAL at 11:13

## 2023-12-07 NOTE — ED ADULT TRIAGE NOTE - NS ED TRIAGE AVPU SCALE
Alert-The patient is alert, awake and responds to voice. The patient is oriented to time, place, and person. The triage nurse is able to obtain subjective information. Quality 226: Preventive Care And Screening: Tobacco Use: Screening And Cessation Intervention: Patient screened for tobacco use and is an ex/non-smoker Detail Level: Detailed Quality 110: Preventive Care And Screening: Influenza Immunization: Influenza Immunization not Administered for Documented Reasons.